# Patient Record
Sex: MALE | Race: WHITE
[De-identification: names, ages, dates, MRNs, and addresses within clinical notes are randomized per-mention and may not be internally consistent; named-entity substitution may affect disease eponyms.]

---

## 2018-04-18 ENCOUNTER — HOSPITAL ENCOUNTER (EMERGENCY)
Dept: HOSPITAL 61 - ER | Age: 83
Discharge: HOME | End: 2018-04-18
Payer: MEDICARE

## 2018-04-18 DIAGNOSIS — I71.4: ICD-10-CM

## 2018-04-18 DIAGNOSIS — B34.9: Primary | ICD-10-CM

## 2018-04-18 LAB
AGAP ISTAT: 16 MMOL/L (ref 6–14)
BUN ISTAT: 29 MG/DL (ref 8–26)
CHLORIDE SERPL-SCNC: 102 MMOL/L (ref 98–110)
CREATININE ISTAT: 1.5 MG/DL (ref 0.5–1.4)
GLUCOSE BLD-MCNC: 138 MG/DL (ref 70–99)
HEMATOCRIT ISTAT: 37 % (ref 37–52)
HEMOGLOBIN ISTAT: 12.6 G/DL (ref 14–18)
ION CA ISTAT: 1.19 MMOL/L (ref 1.13–1.32)
POTASSIUM ISTAT: 3.9 MMOL/L (ref 3.5–5)
SODIUM SERPL-SCNC: 142 MMOL/L (ref 135–145)
TOT CO2 ISTAT: 29 MMOL/L (ref 23–32)
TROPONIN BY ISTAT: 0 NG/ML (ref ?–0.08)

## 2018-04-18 PROCEDURE — 84484 ASSAY OF TROPONIN QUANT: CPT

## 2018-04-18 PROCEDURE — 80047 BASIC METABLC PNL IONIZED CA: CPT

## 2018-04-18 PROCEDURE — 85018 HEMOGLOBIN: CPT

## 2018-04-18 PROCEDURE — 85014 HEMATOCRIT: CPT

## 2018-04-18 PROCEDURE — 36415 COLL VENOUS BLD VENIPUNCTURE: CPT

## 2018-04-18 PROCEDURE — 96361 HYDRATE IV INFUSION ADD-ON: CPT

## 2018-04-18 PROCEDURE — 96374 THER/PROPH/DIAG INJ IV PUSH: CPT

## 2018-04-18 PROCEDURE — 93005 ELECTROCARDIOGRAM TRACING: CPT

## 2018-04-18 PROCEDURE — 99285 EMERGENCY DEPT VISIT HI MDM: CPT

## 2018-04-18 RX ADMIN — BACITRACIN 1 MLS/HR: 5000 INJECTION, POWDER, FOR SOLUTION INTRAMUSCULAR at 02:41

## 2018-04-18 RX ADMIN — ONDANSETRON 1 MG: 2 INJECTION INTRAMUSCULAR; INTRAVENOUS at 02:41

## 2018-05-04 LAB
CREAT SERPL-MCNC: 1.6 MG/DL (ref 0.7–1.3)
GFR SERPLBLD BASED ON 1.73 SQ M-ARVRAT: 41.7 ML/MIN

## 2018-05-07 ENCOUNTER — HOSPITAL ENCOUNTER (OUTPATIENT)
Dept: HOSPITAL 61 - CT | Age: 83
Discharge: HOME | End: 2018-05-07
Attending: SPECIALIST
Payer: MEDICARE

## 2018-05-07 DIAGNOSIS — I10: ICD-10-CM

## 2018-05-07 DIAGNOSIS — N28.1: ICD-10-CM

## 2018-05-07 DIAGNOSIS — I71.4: Primary | ICD-10-CM

## 2018-05-07 DIAGNOSIS — K57.30: ICD-10-CM

## 2018-05-07 DIAGNOSIS — E11.9: ICD-10-CM

## 2018-05-07 DIAGNOSIS — Z87.891: ICD-10-CM

## 2018-05-07 PROCEDURE — 74174 CTA ABD&PLVS W/CONTRAST: CPT

## 2018-05-07 PROCEDURE — 36415 COLL VENOUS BLD VENIPUNCTURE: CPT

## 2018-05-07 PROCEDURE — 82565 ASSAY OF CREATININE: CPT

## 2018-05-07 RX ADMIN — IOHEXOL 1 ML: 300 INJECTION, SOLUTION INTRAVENOUS at 10:36

## 2018-06-11 ENCOUNTER — HOSPITAL ENCOUNTER (INPATIENT)
Dept: HOSPITAL 61 - 4 NORTH | Age: 83
LOS: 11 days | Discharge: HOME HEALTH | DRG: 853 | End: 2018-06-22
Attending: INTERNAL MEDICINE | Admitting: INTERNAL MEDICINE
Payer: MEDICARE

## 2018-06-11 DIAGNOSIS — J44.1: ICD-10-CM

## 2018-06-11 DIAGNOSIS — N40.0: ICD-10-CM

## 2018-06-11 DIAGNOSIS — E86.0: ICD-10-CM

## 2018-06-11 DIAGNOSIS — K63.1: ICD-10-CM

## 2018-06-11 DIAGNOSIS — E11.22: ICD-10-CM

## 2018-06-11 DIAGNOSIS — Z87.11: ICD-10-CM

## 2018-06-11 DIAGNOSIS — K56.7: ICD-10-CM

## 2018-06-11 DIAGNOSIS — K66.8: ICD-10-CM

## 2018-06-11 DIAGNOSIS — J98.11: ICD-10-CM

## 2018-06-11 DIAGNOSIS — I12.9: ICD-10-CM

## 2018-06-11 DIAGNOSIS — N17.0: ICD-10-CM

## 2018-06-11 DIAGNOSIS — K26.1: ICD-10-CM

## 2018-06-11 DIAGNOSIS — N28.1: ICD-10-CM

## 2018-06-11 DIAGNOSIS — K26.5: ICD-10-CM

## 2018-06-11 DIAGNOSIS — I71.4: ICD-10-CM

## 2018-06-11 DIAGNOSIS — Z82.49: ICD-10-CM

## 2018-06-11 DIAGNOSIS — N18.3: ICD-10-CM

## 2018-06-11 DIAGNOSIS — A41.9: Primary | ICD-10-CM

## 2018-06-11 DIAGNOSIS — J96.00: ICD-10-CM

## 2018-06-11 DIAGNOSIS — Z87.891: ICD-10-CM

## 2018-06-11 PROCEDURE — 83690 ASSAY OF LIPASE: CPT

## 2018-06-11 PROCEDURE — 96361 HYDRATE IV INFUSION ADD-ON: CPT

## 2018-06-11 PROCEDURE — 97166 OT EVAL MOD COMPLEX 45 MIN: CPT

## 2018-06-11 PROCEDURE — 97530 THERAPEUTIC ACTIVITIES: CPT

## 2018-06-11 PROCEDURE — 97116 GAIT TRAINING THERAPY: CPT

## 2018-06-11 PROCEDURE — 82962 GLUCOSE BLOOD TEST: CPT

## 2018-06-11 PROCEDURE — 80053 COMPREHEN METABOLIC PANEL: CPT

## 2018-06-11 PROCEDURE — 80076 HEPATIC FUNCTION PANEL: CPT

## 2018-06-11 PROCEDURE — 94640 AIRWAY INHALATION TREATMENT: CPT

## 2018-06-11 PROCEDURE — 97162 PT EVAL MOD COMPLEX 30 MIN: CPT

## 2018-06-11 PROCEDURE — 85610 PROTHROMBIN TIME: CPT

## 2018-06-11 PROCEDURE — 97535 SELF CARE MNGMENT TRAINING: CPT

## 2018-06-11 PROCEDURE — 86850 RBC ANTIBODY SCREEN: CPT

## 2018-06-11 PROCEDURE — 83605 ASSAY OF LACTIC ACID: CPT

## 2018-06-11 PROCEDURE — 80048 BASIC METABOLIC PNL TOTAL CA: CPT

## 2018-06-11 PROCEDURE — 93005 ELECTROCARDIOGRAM TRACING: CPT

## 2018-06-11 PROCEDURE — 96375 TX/PRO/DX INJ NEW DRUG ADDON: CPT

## 2018-06-11 PROCEDURE — 86900 BLOOD TYPING SEROLOGIC ABO: CPT

## 2018-06-11 PROCEDURE — 71275 CT ANGIOGRAPHY CHEST: CPT

## 2018-06-11 PROCEDURE — 85025 COMPLETE CBC W/AUTO DIFF WBC: CPT

## 2018-06-11 PROCEDURE — 86901 BLOOD TYPING SEROLOGIC RH(D): CPT

## 2018-06-11 PROCEDURE — 36415 COLL VENOUS BLD VENIPUNCTURE: CPT

## 2018-06-11 PROCEDURE — 94760 N-INVAS EAR/PLS OXIMETRY 1: CPT

## 2018-06-11 PROCEDURE — 71045 X-RAY EXAM CHEST 1 VIEW: CPT

## 2018-06-11 PROCEDURE — 96365 THER/PROPH/DIAG IV INF INIT: CPT

## 2018-06-11 PROCEDURE — 84484 ASSAY OF TROPONIN QUANT: CPT

## 2018-06-11 PROCEDURE — 87040 BLOOD CULTURE FOR BACTERIA: CPT

## 2018-06-11 PROCEDURE — 96376 TX/PRO/DX INJ SAME DRUG ADON: CPT

## 2018-06-11 PROCEDURE — 85007 BL SMEAR W/DIFF WBC COUNT: CPT

## 2018-06-11 PROCEDURE — 99285 EMERGENCY DEPT VISIT HI MDM: CPT

## 2018-06-11 PROCEDURE — 74174 CTA ABD&PLVS W/CONTRAST: CPT

## 2018-06-11 PROCEDURE — 85730 THROMBOPLASTIN TIME PARTIAL: CPT

## 2018-06-12 LAB
ADD MAN DIFF?: NO
ALBUMIN SERPL-MCNC: 3.9 G/DL (ref 3.4–5)
ALP SERPL-CCNC: 73 U/L (ref 46–116)
ALT (SGPT): 19 U/L (ref 16–63)
ANION GAP SERPL CALC-SCNC: 10 MMOL/L (ref 6–14)
AST SERPL-CCNC: 13 U/L (ref 15–37)
BASO #: 0.1 X10^3/UL (ref 0–0.2)
BASO %: 1 % (ref 0–3)
BILIRUB DIRECT SERPL-MCNC: 0.1 MG/DL (ref 0–0.2)
BLOOD UREA NITROGEN: 36 MG/DL (ref 8–26)
CALCIUM: 10.3 MG/DL (ref 8.5–10.1)
CHLORIDE: 103 MMOL/L (ref 98–107)
CO2 SERPL-SCNC: 28 MMOL/L (ref 21–32)
CREAT SERPL-MCNC: 1.5 MG/DL (ref 0.7–1.3)
EOS #: 0.3 X10^3/UL (ref 0–0.7)
EOS %: 4 % (ref 0–3)
GFR SERPLBLD BASED ON 1.73 SQ M-ARVRAT: 44.9 ML/MIN
GLUCOSE SERPL-MCNC: 113 MG/DL (ref 70–99)
HCG SERPL-ACNC: 7.9 X10^3/UL (ref 4–11)
HEMATOCRIT: 34.9 % (ref 39–53)
HEMOGLOBIN: 11.9 G/DL (ref 13–17.5)
INR: 1.2 (ref 0.8–1.1)
LIPASE: 177 U/L (ref 73–393)
LYMPH #: 2 X10^3/UL (ref 1–4.8)
LYMPH %: 26 % (ref 24–48)
MEAN CORPUSCULAR HEMOGLOBIN: 32 PG (ref 25–35)
MEAN CORPUSCULAR HGB CONC: 34 G/DL (ref 31–37)
MEAN CORPUSCULAR VOLUME: 93 FL (ref 79–100)
MONO #: 0.6 X10^3/UL (ref 0–1.1)
MONO %: 7 % (ref 0–9)
NEUT #: 4.9 X10^3UL (ref 1.8–7.7)
NEUT %: 62 % (ref 31–73)
PARTIAL THROMBOPLASTIN TIME: 30 SEC (ref 24–38)
PLATELET COUNT: 209 X10^3/UL (ref 140–400)
POC GLUCOSE: 128 MG/DL (ref 70–99)
POC GLUCOSE: 128 MG/DL (ref 70–99)
POC GLUCOSE: 143 MG/DL (ref 70–99)
POTASSIUM SERPL-SCNC: 4.4 MMOL/L (ref 3.5–5.1)
PROTHROMBIN TIME PATIENT: 14.4 SEC (ref 11.7–14)
RED BLOOD COUNT: 3.74 X10^6/UL (ref 4.3–5.7)
RED CELL DISTRIBUTION WIDTH: 12.7 % (ref 11.5–14.5)
SODIUM: 141 MMOL/L (ref 136–145)
TOTAL BILIRUBIN: 0.4 MG/DL (ref 0.2–1)
TOTAL PROTEIN: 6.9 G/DL (ref 6.4–8.2)
TROPONINI: < 0.017 NG/ML (ref 0–0.06)

## 2018-06-12 PROCEDURE — 0DU947Z SUPPLEMENT DUODENUM WITH AUTOLOGOUS TISSUE SUBSTITUTE, PERCUTANEOUS ENDOSCOPIC APPROACH: ICD-10-PCS

## 2018-06-12 RX ADMIN — FENTANYL CITRATE 1 MCG: 50 INJECTION INTRAMUSCULAR; INTRAVENOUS at 00:36

## 2018-06-12 RX ADMIN — PANTOPRAZOLE SODIUM 1 MLS/HR: 40 INJECTION, POWDER, FOR SOLUTION INTRAVENOUS at 09:43

## 2018-06-12 RX ADMIN — FENTANYL CITRATE 1 MCG: 50 INJECTION INTRAMUSCULAR; INTRAVENOUS at 01:38

## 2018-06-12 RX ADMIN — FENTANYL CITRATE 1 MCG: 50 INJECTION INTRAMUSCULAR; INTRAVENOUS at 06:55

## 2018-06-12 RX ADMIN — PANTOPRAZOLE SODIUM 1 MG: 40 INJECTION, POWDER, FOR SOLUTION INTRAVENOUS at 17:04

## 2018-06-12 RX ADMIN — PIPERACILLIN SODIUM AND TAZOBACTAM SODIUM 1 MLS/HR: 3; .375 INJECTION, POWDER, LYOPHILIZED, FOR SOLUTION INTRAVENOUS at 09:10

## 2018-06-12 RX ADMIN — SODIUM CHLORIDE 1 MLS/HR: 450 INJECTION, SOLUTION INTRAVENOUS at 17:03

## 2018-06-12 RX ADMIN — ONDANSETRON 1 MG: 2 INJECTION INTRAMUSCULAR; INTRAVENOUS at 02:44

## 2018-06-12 RX ADMIN — FENTANYL CITRATE 1 MCG: 50 INJECTION INTRAMUSCULAR; INTRAVENOUS at 06:33

## 2018-06-12 RX ADMIN — PIPERACILLIN SODIUM AND TAZOBACTAM SODIUM 1 MLS/HR: 3; .375 INJECTION, POWDER, LYOPHILIZED, FOR SOLUTION INTRAVENOUS at 23:54

## 2018-06-12 RX ADMIN — ENOXAPARIN SODIUM 1 MG: 40 INJECTION SUBCUTANEOUS at 17:05

## 2018-06-12 RX ADMIN — PIPERACILLIN SODIUM AND TAZOBACTAM SODIUM 1 MLS/HR: 3; .375 INJECTION, POWDER, LYOPHILIZED, FOR SOLUTION INTRAVENOUS at 17:05

## 2018-06-12 RX ADMIN — BACITRACIN 1 MLS/HR: 5000 INJECTION, POWDER, FOR SOLUTION INTRAMUSCULAR at 00:35

## 2018-06-12 RX ADMIN — PANTOPRAZOLE SODIUM 1 MG: 40 INJECTION, POWDER, FOR SOLUTION INTRAVENOUS at 09:10

## 2018-06-12 RX ADMIN — PIPERACILLIN SODIUM AND TAZOBACTAM SODIUM 1 MLS/HR: 3; .375 INJECTION, POWDER, LYOPHILIZED, FOR SOLUTION INTRAVENOUS at 03:30

## 2018-06-12 RX ADMIN — SODIUM CHLORIDE 1 MLS/HR: 450 INJECTION, SOLUTION INTRAVENOUS at 09:44

## 2018-06-12 RX ADMIN — SODIUM CHLORIDE, SODIUM LACTATE, POTASSIUM CHLORIDE, AND CALCIUM CHLORIDE 1 MLS/HR: .6; .31; .03; .02 INJECTION, SOLUTION INTRAVENOUS at 03:33

## 2018-06-12 RX ADMIN — FENTANYL CITRATE 1 MCG: 50 INJECTION INTRAMUSCULAR; INTRAVENOUS at 02:45

## 2018-06-12 RX ADMIN — SODIUM CHLORIDE, SODIUM LACTATE, POTASSIUM CHLORIDE, AND CALCIUM CHLORIDE 1 MLS/HR: .6; .31; .03; .02 INJECTION, SOLUTION INTRAVENOUS at 09:10

## 2018-06-12 RX ADMIN — Medication 1 CAP: at 21:00

## 2018-06-12 RX ADMIN — ONDANSETRON 1 MG: 2 INJECTION INTRAMUSCULAR; INTRAVENOUS at 00:35

## 2018-06-12 RX ADMIN — IOHEXOL 1 ML: 300 INJECTION, SOLUTION INTRAVENOUS at 09:49

## 2018-06-12 RX ADMIN — PIPERACILLIN SODIUM AND TAZOBACTAM SODIUM 1 MLS/HR: 3; .375 INJECTION, POWDER, LYOPHILIZED, FOR SOLUTION INTRAVENOUS at 11:53

## 2018-06-12 RX ADMIN — MORPHINE SULFATE 1 MG: 4 INJECTION, SOLUTION INTRAMUSCULAR; INTRAVENOUS at 03:34

## 2018-06-13 LAB
% BANDS: 16 % (ref 0–9)
% BASOS: 1 % (ref 0–3)
% LYMPHS: 3 % (ref 24–48)
% METAS: 3 % (ref 0–0)
% MONOS: 4 % (ref 0–10)
% SEGS: 73 % (ref 35–66)
ADD MAN DIFF?: YES
ANION GAP SERPL CALC-SCNC: 7 MMOL/L (ref 6–14)
BASO #: 0 X10^3/UL (ref 0–0.2)
BASO %: 0 % (ref 0–3)
BLOOD UREA NITROGEN: 31 MG/DL (ref 8–26)
CALCIUM: 8.7 MG/DL (ref 8.5–10.1)
CHLORIDE: 103 MMOL/L (ref 98–107)
CO2 SERPL-SCNC: 27 MMOL/L (ref 21–32)
CREAT SERPL-MCNC: 1.7 MG/DL (ref 0.7–1.3)
DOHLE BODIES: PRESENT
EOS #: 0 X10^3/UL (ref 0–0.7)
EOS %: 0 % (ref 0–3)
GFR SERPLBLD BASED ON 1.73 SQ M-ARVRAT: 38.9 ML/MIN
GLUCOSE SERPL-MCNC: 116 MG/DL (ref 70–99)
HCG SERPL-ACNC: 12.2 X10^3/UL (ref 4–11)
HEMATOCRIT: 31.5 % (ref 39–53)
HEMOGLOBIN: 10.6 G/DL (ref 13–17.5)
LACTIC ACID: 1.5 MMOL/L (ref 0.4–2)
LYMPH #: 0.6 X10^3/UL (ref 1–4.8)
LYMPH %: 5 % (ref 24–48)
MEAN CORPUSCULAR HEMOGLOBIN: 32 PG (ref 25–35)
MEAN CORPUSCULAR HGB CONC: 34 G/DL (ref 31–37)
MEAN CORPUSCULAR VOLUME: 94 FL (ref 79–100)
MONO #: 1 X10^3/UL (ref 0–1.1)
MONO %: 8 % (ref 0–9)
NEUT #: 10.6 X10^3UL (ref 1.8–7.7)
NEUT %: 87 % (ref 31–73)
PLATELET COUNT: 175 X10^3/UL (ref 140–400)
PLT ESTIMATE: ADEQUATE
POC GLUCOSE: 100 MG/DL (ref 70–99)
POC GLUCOSE: 119 MG/DL (ref 70–99)
POC GLUCOSE: 99 MG/DL (ref 70–99)
POTASSIUM SERPL-SCNC: 4.6 MMOL/L (ref 3.5–5.1)
RED BLOOD COUNT: 3.36 X10^6/UL (ref 4.3–5.7)
RED CELL DISTRIBUTION WIDTH: 13.1 % (ref 11.5–14.5)
SODIUM: 137 MMOL/L (ref 136–145)
TOXIC VACUOLATION: SLIGHT

## 2018-06-13 RX ADMIN — VANCOMYCIN HYDROCHLORIDE 1 MLS/HR: 1 INJECTION, POWDER, FOR SOLUTION INTRAVENOUS at 16:19

## 2018-06-13 RX ADMIN — ALBUTEROL SULFATE 1 MG: 108 AEROSOL, METERED RESPIRATORY (INHALATION) at 17:31

## 2018-06-13 RX ADMIN — PIPERACILLIN SODIUM AND TAZOBACTAM SODIUM 1 MLS/HR: 3; .375 INJECTION, POWDER, LYOPHILIZED, FOR SOLUTION INTRAVENOUS at 13:00

## 2018-06-13 RX ADMIN — BACITRACIN 1 MLS/HR: 5000 INJECTION, POWDER, FOR SOLUTION INTRAMUSCULAR at 22:00

## 2018-06-13 RX ADMIN — GLYCERIN, ISOLEUCINE, LEUCINE, LYSINE, METHIONINE, PHENYLALANINE, THREONINE, TRYPTOPHAN, VALINE, ALANINE, GLYCINE, ARGININE, HISTIDINE, PROLINE, SERINE, CYSTEINE, SODIUM ACETATE, MAGNESIUM ACETATE, CALCIUM ACETATE, SODIUM CHLORIDE, POTASSIUM CHLORIDE, PHOSPHORIC ACID, AND POTASSIUM METABISULFITE 1 MLS/HR
3; .21; .27; .22; .16; .17; .12; .046; .2; .21; .42; .29; .085; .34; .18; .014; .2; .054; .026; .12; .15; .041 INJECTION INTRAVENOUS at 16:18

## 2018-06-13 RX ADMIN — BACITRACIN 1 MLS/HR: 5000 INJECTION, POWDER, FOR SOLUTION INTRAMUSCULAR at 16:48

## 2018-06-13 RX ADMIN — ENOXAPARIN SODIUM 1 MG: 40 INJECTION SUBCUTANEOUS at 16:30

## 2018-06-13 RX ADMIN — PANTOPRAZOLE SODIUM 1 MG: 40 INJECTION, POWDER, FOR SOLUTION INTRAVENOUS at 10:52

## 2018-06-13 RX ADMIN — VANCOMYCIN HYDROCHLORIDE 1 EACH: 1 INJECTION, POWDER, LYOPHILIZED, FOR SOLUTION INTRAVENOUS at 18:44

## 2018-06-13 RX ADMIN — PIPERACILLIN SODIUM AND TAZOBACTAM SODIUM 1 MLS/HR: 3; .375 INJECTION, POWDER, LYOPHILIZED, FOR SOLUTION INTRAVENOUS at 18:15

## 2018-06-13 RX ADMIN — SODIUM CHLORIDE 1 MLS/HR: 450 INJECTION, SOLUTION INTRAVENOUS at 05:59

## 2018-06-13 RX ADMIN — Medication 1 CAP: at 21:00

## 2018-06-13 RX ADMIN — PIPERACILLIN SODIUM AND TAZOBACTAM SODIUM 1 MLS/HR: 3; .375 INJECTION, POWDER, LYOPHILIZED, FOR SOLUTION INTRAVENOUS at 06:00

## 2018-06-13 RX ADMIN — Medication 1 CAP: at 09:00

## 2018-06-13 RX ADMIN — ALBUTEROL SULFATE 1 MG: 108 AEROSOL, METERED RESPIRATORY (INHALATION) at 22:17

## 2018-06-14 LAB
ADD MAN DIFF?: NO
ANION GAP SERPL CALC-SCNC: 9 MMOL/L (ref 6–14)
BASO #: 0 X10^3/UL (ref 0–0.2)
BASO %: 0 % (ref 0–3)
BLOOD UREA NITROGEN: 27 MG/DL (ref 8–26)
CALCIUM: 8.2 MG/DL (ref 8.5–10.1)
CHLORIDE: 99 MMOL/L (ref 98–107)
CO2 SERPL-SCNC: 23 MMOL/L (ref 21–32)
CREAT SERPL-MCNC: 1.4 MG/DL (ref 0.7–1.3)
EOS #: 0 X10^3/UL (ref 0–0.7)
EOS %: 0 % (ref 0–3)
GFR SERPLBLD BASED ON 1.73 SQ M-ARVRAT: 48.6 ML/MIN
GLUCOSE SERPL-MCNC: 164 MG/DL (ref 70–99)
HCG SERPL-ACNC: 13.5 X10^3/UL (ref 4–11)
HEMATOCRIT: 30.7 % (ref 39–53)
HEMOGLOBIN: 10.5 G/DL (ref 13–17.5)
LACTIC ACID: 1 MMOL/L (ref 0.4–2)
LACTIC ACID: 1.4 MMOL/L (ref 0.4–2)
LACTIC ACID: 2.4 MMOL/L (ref 0.4–2)
LYMPH #: 0.5 X10^3/UL (ref 1–4.8)
LYMPH %: 4 % (ref 24–48)
MEAN CORPUSCULAR HEMOGLOBIN: 32 PG (ref 25–35)
MEAN CORPUSCULAR HGB CONC: 34 G/DL (ref 31–37)
MEAN CORPUSCULAR VOLUME: 93 FL (ref 79–100)
MONO #: 0.6 X10^3/UL (ref 0–1.1)
MONO %: 5 % (ref 0–9)
NEUT #: 12.3 X10^3UL (ref 1.8–7.7)
NEUT %: 92 % (ref 31–73)
PLATELET COUNT: 191 X10^3/UL (ref 140–400)
POC GLUCOSE: 135 MG/DL (ref 70–99)
POC GLUCOSE: 158 MG/DL (ref 70–99)
POC GLUCOSE: 163 MG/DL (ref 70–99)
POC GLUCOSE: 173 MG/DL (ref 70–99)
POTASSIUM SERPL-SCNC: 3.9 MMOL/L (ref 3.5–5.1)
RED BLOOD COUNT: 3.3 X10^6/UL (ref 4.3–5.7)
RED CELL DISTRIBUTION WIDTH: 12.6 % (ref 11.5–14.5)
SODIUM: 131 MMOL/L (ref 136–145)

## 2018-06-14 RX ADMIN — ALBUTEROL SULFATE 1 MG: 108 AEROSOL, METERED RESPIRATORY (INHALATION) at 09:47

## 2018-06-14 RX ADMIN — PIPERACILLIN SODIUM AND TAZOBACTAM SODIUM 1 MLS/HR: 3; .375 INJECTION, POWDER, LYOPHILIZED, FOR SOLUTION INTRAVENOUS at 12:36

## 2018-06-14 RX ADMIN — TAMSULOSIN HYDROCHLORIDE 1 MG: 0.4 CAPSULE ORAL at 09:35

## 2018-06-14 RX ADMIN — GLYCERIN, ISOLEUCINE, LEUCINE, LYSINE, METHIONINE, PHENYLALANINE, THREONINE, TRYPTOPHAN, VALINE, ALANINE, GLYCINE, ARGININE, HISTIDINE, PROLINE, SERINE, CYSTEINE, SODIUM ACETATE, MAGNESIUM ACETATE, CALCIUM ACETATE, SODIUM CHLORIDE, POTASSIUM CHLORIDE, PHOSPHORIC ACID, AND POTASSIUM METABISULFITE 1 MLS/HR
3; .21; .27; .22; .16; .17; .12; .046; .2; .21; .42; .29; .085; .34; .18; .014; .2; .054; .026; .12; .15; .041 INJECTION INTRAVENOUS at 15:15

## 2018-06-14 RX ADMIN — PIPERACILLIN SODIUM AND TAZOBACTAM SODIUM 1 MLS/HR: 3; .375 INJECTION, POWDER, LYOPHILIZED, FOR SOLUTION INTRAVENOUS at 18:00

## 2018-06-14 RX ADMIN — GLYCERIN, ISOLEUCINE, LEUCINE, LYSINE, METHIONINE, PHENYLALANINE, THREONINE, TRYPTOPHAN, VALINE, ALANINE, GLYCINE, ARGININE, HISTIDINE, PROLINE, SERINE, CYSTEINE, SODIUM ACETATE, MAGNESIUM ACETATE, CALCIUM ACETATE, SODIUM CHLORIDE, POTASSIUM CHLORIDE, PHOSPHORIC ACID, AND POTASSIUM METABISULFITE 1 MLS/HR
3; .21; .27; .22; .16; .17; .12; .046; .2; .21; .42; .29; .085; .34; .18; .014; .2; .054; .026; .12; .15; .041 INJECTION INTRAVENOUS at 06:00

## 2018-06-14 RX ADMIN — ALBUTEROL SULFATE 1 MG: 108 AEROSOL, METERED RESPIRATORY (INHALATION) at 02:40

## 2018-06-14 RX ADMIN — PIPERACILLIN SODIUM AND TAZOBACTAM SODIUM 1 MLS/HR: 3; .375 INJECTION, POWDER, LYOPHILIZED, FOR SOLUTION INTRAVENOUS at 06:05

## 2018-06-14 RX ADMIN — BACITRACIN 1 MLS/HR: 5000 INJECTION, POWDER, FOR SOLUTION INTRAMUSCULAR at 13:27

## 2018-06-14 RX ADMIN — Medication 1 CAP: at 21:00

## 2018-06-14 RX ADMIN — ENOXAPARIN SODIUM 1 MG: 40 INJECTION SUBCUTANEOUS at 16:21

## 2018-06-14 RX ADMIN — FLUCONAZOLE IN SODIUM CHLORIDE 1 MLS/HR: 2 INJECTION, SOLUTION INTRAVENOUS at 13:55

## 2018-06-14 RX ADMIN — BACITRACIN 1 MLS/HR: 5000 INJECTION, POWDER, FOR SOLUTION INTRAMUSCULAR at 04:00

## 2018-06-14 RX ADMIN — ACETAMINOPHEN 1 MG: 325 SUPPOSITORY RECTAL at 14:06

## 2018-06-14 RX ADMIN — PIPERACILLIN SODIUM AND TAZOBACTAM SODIUM 1 MLS/HR: 3; .375 INJECTION, POWDER, LYOPHILIZED, FOR SOLUTION INTRAVENOUS at 00:25

## 2018-06-14 RX ADMIN — PANTOPRAZOLE SODIUM 1 MG: 40 INJECTION, POWDER, FOR SOLUTION INTRAVENOUS at 08:27

## 2018-06-14 RX ADMIN — Medication 1 CAP: at 08:18

## 2018-06-15 LAB
ADD MAN DIFF?: NO
ALBUMIN SERPL-MCNC: 2.1 G/DL (ref 3.4–5)
ALBUMIN/GLOB SERPL: 0.5 {RATIO} (ref 1–1.7)
ALP SERPL-CCNC: 57 U/L (ref 46–116)
ALT (SGPT): 10 U/L (ref 16–63)
ANION GAP SERPL CALC-SCNC: 9 MMOL/L (ref 6–14)
AST SERPL-CCNC: 11 U/L (ref 15–37)
BASO #: 0 X10^3/UL (ref 0–0.2)
BASO %: 0 % (ref 0–3)
BLOOD UREA NITROGEN: 22 MG/DL (ref 8–26)
BUN/CREAT SERPL: 20 (ref 6–20)
CALCIUM: 8.4 MG/DL (ref 8.5–10.1)
CHLORIDE: 100 MMOL/L (ref 98–107)
CO2 SERPL-SCNC: 23 MMOL/L (ref 21–32)
CREAT SERPL-MCNC: 1.1 MG/DL (ref 0.7–1.3)
EOS #: 0.1 X10^3/UL (ref 0–0.7)
EOS %: 0 % (ref 0–3)
GFR SERPLBLD BASED ON 1.73 SQ M-ARVRAT: 64.2 ML/MIN
GLOBULIN SER-MCNC: 3.9 G/DL (ref 2.2–3.8)
GLUCOSE SERPL-MCNC: 146 MG/DL (ref 70–99)
HCG SERPL-ACNC: 14.5 X10^3/UL (ref 4–11)
HEMATOCRIT: 31.2 % (ref 39–53)
HEMOGLOBIN: 10.6 G/DL (ref 13–17.5)
LACTIC ACID: 1.4 MMOL/L (ref 0.4–2)
LYMPH #: 0.4 X10^3/UL (ref 1–4.8)
LYMPH %: 3 % (ref 24–48)
MEAN CORPUSCULAR HEMOGLOBIN: 31 PG (ref 25–35)
MEAN CORPUSCULAR HGB CONC: 34 G/DL (ref 31–37)
MEAN CORPUSCULAR VOLUME: 92 FL (ref 79–100)
MONO #: 0.9 X10^3/UL (ref 0–1.1)
MONO %: 6 % (ref 0–9)
NEUT #: 13.1 X10^3UL (ref 1.8–7.7)
NEUT %: 91 % (ref 31–73)
PLATELET COUNT: 207 X10^3/UL (ref 140–400)
POC GLUCOSE: 118 MG/DL (ref 70–99)
POC GLUCOSE: 127 MG/DL (ref 70–99)
POC GLUCOSE: 136 MG/DL (ref 70–99)
POC GLUCOSE: 138 MG/DL (ref 70–99)
POTASSIUM SERPL-SCNC: 3.6 MMOL/L (ref 3.5–5.1)
RED BLOOD COUNT: 3.38 X10^6/UL (ref 4.3–5.7)
RED CELL DISTRIBUTION WIDTH: 12.8 % (ref 11.5–14.5)
SODIUM: 132 MMOL/L (ref 136–145)
TOTAL BILIRUBIN: 0.4 MG/DL (ref 0.2–1)
TOTAL PROTEIN: 6 G/DL (ref 6.4–8.2)

## 2018-06-15 RX ADMIN — ENOXAPARIN SODIUM 1 MG: 40 INJECTION SUBCUTANEOUS at 16:00

## 2018-06-15 RX ADMIN — Medication 1 CAP: at 07:59

## 2018-06-15 RX ADMIN — GLYCERIN, ISOLEUCINE, LEUCINE, LYSINE, METHIONINE, PHENYLALANINE, THREONINE, TRYPTOPHAN, VALINE, ALANINE, GLYCINE, ARGININE, HISTIDINE, PROLINE, SERINE, CYSTEINE, SODIUM ACETATE, MAGNESIUM ACETATE, CALCIUM ACETATE, SODIUM CHLORIDE, POTASSIUM CHLORIDE, PHOSPHORIC ACID, AND POTASSIUM METABISULFITE 1 MLS/HR
3; .21; .27; .22; .16; .17; .12; .046; .2; .21; .42; .29; .085; .34; .18; .014; .2; .054; .026; .12; .15; .041 INJECTION INTRAVENOUS at 04:11

## 2018-06-15 RX ADMIN — PIPERACILLIN SODIUM AND TAZOBACTAM SODIUM 1 MLS/HR: 3; .375 INJECTION, POWDER, LYOPHILIZED, FOR SOLUTION INTRAVENOUS at 06:05

## 2018-06-15 RX ADMIN — PIPERACILLIN SODIUM AND TAZOBACTAM SODIUM 1 MLS/HR: 3; .375 INJECTION, POWDER, LYOPHILIZED, FOR SOLUTION INTRAVENOUS at 18:06

## 2018-06-15 RX ADMIN — PIPERACILLIN SODIUM AND TAZOBACTAM SODIUM 1 MLS/HR: 3; .375 INJECTION, POWDER, LYOPHILIZED, FOR SOLUTION INTRAVENOUS at 00:16

## 2018-06-15 RX ADMIN — FLUCONAZOLE IN SODIUM CHLORIDE 1 MLS/HR: 2 INJECTION, SOLUTION INTRAVENOUS at 11:48

## 2018-06-15 RX ADMIN — PANTOPRAZOLE SODIUM 1 MG: 40 INJECTION, POWDER, FOR SOLUTION INTRAVENOUS at 08:16

## 2018-06-15 RX ADMIN — PIPERACILLIN SODIUM AND TAZOBACTAM SODIUM 1 MLS/HR: 3; .375 INJECTION, POWDER, LYOPHILIZED, FOR SOLUTION INTRAVENOUS at 23:42

## 2018-06-15 RX ADMIN — Medication 1 CAP: at 20:59

## 2018-06-15 RX ADMIN — GLYCERIN, ISOLEUCINE, LEUCINE, LYSINE, METHIONINE, PHENYLALANINE, THREONINE, TRYPTOPHAN, VALINE, ALANINE, GLYCINE, ARGININE, HISTIDINE, PROLINE, SERINE, CYSTEINE, SODIUM ACETATE, MAGNESIUM ACETATE, CALCIUM ACETATE, SODIUM CHLORIDE, POTASSIUM CHLORIDE, PHOSPHORIC ACID, AND POTASSIUM METABISULFITE 1 MLS/HR
3; .21; .27; .22; .16; .17; .12; .046; .2; .21; .42; .29; .085; .34; .18; .014; .2; .054; .026; .12; .15; .041 INJECTION INTRAVENOUS at 16:08

## 2018-06-15 RX ADMIN — TAMSULOSIN HYDROCHLORIDE 1 MG: 0.4 CAPSULE ORAL at 08:17

## 2018-06-15 RX ADMIN — PIPERACILLIN SODIUM AND TAZOBACTAM SODIUM 1 MLS/HR: 3; .375 INJECTION, POWDER, LYOPHILIZED, FOR SOLUTION INTRAVENOUS at 11:51

## 2018-06-16 LAB
ADD MAN DIFF?: NO
ALBUMIN SERPL-MCNC: 2 G/DL (ref 3.4–5)
ALBUMIN/GLOB SERPL: 0.5 {RATIO} (ref 1–1.7)
ALP SERPL-CCNC: 53 U/L (ref 46–116)
ALT (SGPT): 14 U/L (ref 16–63)
ANION GAP SERPL CALC-SCNC: 7 MMOL/L (ref 6–14)
AST SERPL-CCNC: 12 U/L (ref 15–37)
BASO #: 0 X10^3/UL (ref 0–0.2)
BASO %: 0 % (ref 0–3)
BLOOD UREA NITROGEN: 22 MG/DL (ref 8–26)
BUN/CREAT SERPL: 22 (ref 6–20)
CALCIUM: 8.4 MG/DL (ref 8.5–10.1)
CHLORIDE: 99 MMOL/L (ref 98–107)
CO2 SERPL-SCNC: 24 MMOL/L (ref 21–32)
CREAT SERPL-MCNC: 1 MG/DL (ref 0.7–1.3)
EOS #: 0.3 X10^3/UL (ref 0–0.7)
EOS %: 2 % (ref 0–3)
GFR SERPLBLD BASED ON 1.73 SQ M-ARVRAT: 71.7 ML/MIN
GLOBULIN SER-MCNC: 3.8 G/DL (ref 2.2–3.8)
GLUCOSE SERPL-MCNC: 130 MG/DL (ref 70–99)
HCG SERPL-ACNC: 12.6 X10^3/UL (ref 4–11)
HEMATOCRIT: 28.2 % (ref 39–53)
HEMOGLOBIN: 9.6 G/DL (ref 13–17.5)
LYMPH #: 0.7 X10^3/UL (ref 1–4.8)
LYMPH %: 5 % (ref 24–48)
MEAN CORPUSCULAR HEMOGLOBIN: 31 PG (ref 25–35)
MEAN CORPUSCULAR HGB CONC: 34 G/DL (ref 31–37)
MEAN CORPUSCULAR VOLUME: 92 FL (ref 79–100)
MONO #: 1.2 X10^3/UL (ref 0–1.1)
MONO %: 9 % (ref 0–9)
NEUT #: 10.4 X10^3UL (ref 1.8–7.7)
NEUT %: 83 % (ref 31–73)
PLATELET COUNT: 224 X10^3/UL (ref 140–400)
POC GLUCOSE: 129 MG/DL (ref 70–99)
POC GLUCOSE: 180 MG/DL (ref 70–99)
POTASSIUM SERPL-SCNC: 3.9 MMOL/L (ref 3.5–5.1)
RED BLOOD COUNT: 3.08 X10^6/UL (ref 4.3–5.7)
RED CELL DISTRIBUTION WIDTH: 12.7 % (ref 11.5–14.5)
SODIUM: 130 MMOL/L (ref 136–145)
TOTAL BILIRUBIN: 0.4 MG/DL (ref 0.2–1)
TOTAL PROTEIN: 5.8 G/DL (ref 6.4–8.2)

## 2018-06-16 RX ADMIN — ALBUTEROL SULFATE 1 MG: 108 AEROSOL, METERED RESPIRATORY (INHALATION) at 07:10

## 2018-06-16 RX ADMIN — FLUCONAZOLE IN SODIUM CHLORIDE 1 MLS/HR: 2 INJECTION, SOLUTION INTRAVENOUS at 12:47

## 2018-06-16 RX ADMIN — PIPERACILLIN SODIUM AND TAZOBACTAM SODIUM 1 MLS/HR: 3; .375 INJECTION, POWDER, LYOPHILIZED, FOR SOLUTION INTRAVENOUS at 17:50

## 2018-06-16 RX ADMIN — OXYCODONE HYDROCHLORIDE AND ACETAMINOPHEN 1 TAB: 5; 325 TABLET ORAL at 10:35

## 2018-06-16 RX ADMIN — Medication 1 CAP: at 22:12

## 2018-06-16 RX ADMIN — PIPERACILLIN SODIUM AND TAZOBACTAM SODIUM 1 MLS/HR: 3; .375 INJECTION, POWDER, LYOPHILIZED, FOR SOLUTION INTRAVENOUS at 06:05

## 2018-06-16 RX ADMIN — GLYCERIN, ISOLEUCINE, LEUCINE, LYSINE, METHIONINE, PHENYLALANINE, THREONINE, TRYPTOPHAN, VALINE, ALANINE, GLYCINE, ARGININE, HISTIDINE, PROLINE, SERINE, CYSTEINE, SODIUM ACETATE, MAGNESIUM ACETATE, CALCIUM ACETATE, SODIUM CHLORIDE, POTASSIUM CHLORIDE, PHOSPHORIC ACID, AND POTASSIUM METABISULFITE 1 MLS/HR
3; .21; .27; .22; .16; .17; .12; .046; .2; .21; .42; .29; .085; .34; .18; .014; .2; .054; .026; .12; .15; .041 INJECTION INTRAVENOUS at 17:15

## 2018-06-16 RX ADMIN — GLYCERIN, ISOLEUCINE, LEUCINE, LYSINE, METHIONINE, PHENYLALANINE, THREONINE, TRYPTOPHAN, VALINE, ALANINE, GLYCINE, ARGININE, HISTIDINE, PROLINE, SERINE, CYSTEINE, SODIUM ACETATE, MAGNESIUM ACETATE, CALCIUM ACETATE, SODIUM CHLORIDE, POTASSIUM CHLORIDE, PHOSPHORIC ACID, AND POTASSIUM METABISULFITE 1 MLS/HR
3; .21; .27; .22; .16; .17; .12; .046; .2; .21; .42; .29; .085; .34; .18; .014; .2; .054; .026; .12; .15; .041 INJECTION INTRAVENOUS at 03:49

## 2018-06-16 RX ADMIN — ALBUTEROL SULFATE 1 MG: 108 AEROSOL, METERED RESPIRATORY (INHALATION) at 16:54

## 2018-06-16 RX ADMIN — PANTOPRAZOLE SODIUM 1 MG: 40 INJECTION, POWDER, FOR SOLUTION INTRAVENOUS at 06:05

## 2018-06-16 RX ADMIN — PIPERACILLIN SODIUM AND TAZOBACTAM SODIUM 1 MLS/HR: 3; .375 INJECTION, POWDER, LYOPHILIZED, FOR SOLUTION INTRAVENOUS at 12:46

## 2018-06-16 RX ADMIN — OXYCODONE HYDROCHLORIDE AND ACETAMINOPHEN 1 TAB: 5; 325 TABLET ORAL at 17:58

## 2018-06-16 RX ADMIN — Medication 1 CAP: at 10:16

## 2018-06-16 RX ADMIN — TAMSULOSIN HYDROCHLORIDE 1 MG: 0.4 CAPSULE ORAL at 10:16

## 2018-06-16 RX ADMIN — ENOXAPARIN SODIUM 1 MG: 40 INJECTION SUBCUTANEOUS at 15:13

## 2018-06-16 RX ADMIN — OXYCODONE HYDROCHLORIDE AND ACETAMINOPHEN 1 TAB: 5; 325 TABLET ORAL at 22:12

## 2018-06-16 RX ADMIN — FINASTERIDE 1 MG: 5 TABLET, FILM COATED ORAL at 15:00

## 2018-06-17 LAB
POC GLUCOSE: 131 MG/DL (ref 70–99)
POC GLUCOSE: 141 MG/DL (ref 70–99)

## 2018-06-17 RX ADMIN — Medication 1 CAP: at 07:38

## 2018-06-17 RX ADMIN — Medication 1 CAP: at 20:54

## 2018-06-17 RX ADMIN — PIPERACILLIN SODIUM AND TAZOBACTAM SODIUM 1 MLS/HR: 3; .375 INJECTION, POWDER, LYOPHILIZED, FOR SOLUTION INTRAVENOUS at 17:28

## 2018-06-17 RX ADMIN — GLYCERIN, ISOLEUCINE, LEUCINE, LYSINE, METHIONINE, PHENYLALANINE, THREONINE, TRYPTOPHAN, VALINE, ALANINE, GLYCINE, ARGININE, HISTIDINE, PROLINE, SERINE, CYSTEINE, SODIUM ACETATE, MAGNESIUM ACETATE, CALCIUM ACETATE, SODIUM CHLORIDE, POTASSIUM CHLORIDE, PHOSPHORIC ACID, AND POTASSIUM METABISULFITE 1 MLS/HR
3; .21; .27; .22; .16; .17; .12; .046; .2; .21; .42; .29; .085; .34; .18; .014; .2; .054; .026; .12; .15; .041 INJECTION INTRAVENOUS at 00:15

## 2018-06-17 RX ADMIN — ENOXAPARIN SODIUM 1 MG: 40 INJECTION SUBCUTANEOUS at 16:22

## 2018-06-17 RX ADMIN — PIPERACILLIN SODIUM AND TAZOBACTAM SODIUM 1 MLS/HR: 3; .375 INJECTION, POWDER, LYOPHILIZED, FOR SOLUTION INTRAVENOUS at 12:14

## 2018-06-17 RX ADMIN — PIPERACILLIN SODIUM AND TAZOBACTAM SODIUM 1 MLS/HR: 3; .375 INJECTION, POWDER, LYOPHILIZED, FOR SOLUTION INTRAVENOUS at 06:05

## 2018-06-17 RX ADMIN — ALBUTEROL SULFATE 1 MG: 108 AEROSOL, METERED RESPIRATORY (INHALATION) at 04:13

## 2018-06-17 RX ADMIN — ALBUTEROL SULFATE 1 MG: 108 AEROSOL, METERED RESPIRATORY (INHALATION) at 20:05

## 2018-06-17 RX ADMIN — GLYCERIN, ISOLEUCINE, LEUCINE, LYSINE, METHIONINE, PHENYLALANINE, THREONINE, TRYPTOPHAN, VALINE, ALANINE, GLYCINE, ARGININE, HISTIDINE, PROLINE, SERINE, CYSTEINE, SODIUM ACETATE, MAGNESIUM ACETATE, CALCIUM ACETATE, SODIUM CHLORIDE, POTASSIUM CHLORIDE, PHOSPHORIC ACID, AND POTASSIUM METABISULFITE 1 MLS/HR
3; .21; .27; .22; .16; .17; .12; .046; .2; .21; .42; .29; .085; .34; .18; .014; .2; .054; .026; .12; .15; .041 INJECTION INTRAVENOUS at 20:59

## 2018-06-17 RX ADMIN — OXYCODONE HYDROCHLORIDE AND ACETAMINOPHEN 1 TAB: 5; 325 TABLET ORAL at 08:32

## 2018-06-17 RX ADMIN — PIPERACILLIN SODIUM AND TAZOBACTAM SODIUM 1 MLS/HR: 3; .375 INJECTION, POWDER, LYOPHILIZED, FOR SOLUTION INTRAVENOUS at 00:15

## 2018-06-17 RX ADMIN — OXYCODONE HYDROCHLORIDE AND ACETAMINOPHEN 1 TAB: 5; 325 TABLET ORAL at 16:25

## 2018-06-17 RX ADMIN — TAMSULOSIN HYDROCHLORIDE 1 MG: 0.4 CAPSULE ORAL at 07:38

## 2018-06-17 RX ADMIN — GLYCERIN, ISOLEUCINE, LEUCINE, LYSINE, METHIONINE, PHENYLALANINE, THREONINE, TRYPTOPHAN, VALINE, ALANINE, GLYCINE, ARGININE, HISTIDINE, PROLINE, SERINE, CYSTEINE, SODIUM ACETATE, MAGNESIUM ACETATE, CALCIUM ACETATE, SODIUM CHLORIDE, POTASSIUM CHLORIDE, PHOSPHORIC ACID, AND POTASSIUM METABISULFITE 1 MLS/HR
3; .21; .27; .22; .16; .17; .12; .046; .2; .21; .42; .29; .085; .34; .18; .014; .2; .054; .026; .12; .15; .041 INJECTION INTRAVENOUS at 05:45

## 2018-06-17 RX ADMIN — PANTOPRAZOLE SODIUM 1 MG: 40 INJECTION, POWDER, FOR SOLUTION INTRAVENOUS at 06:05

## 2018-06-17 RX ADMIN — FINASTERIDE 1 MG: 5 TABLET, FILM COATED ORAL at 09:00

## 2018-06-17 RX ADMIN — OXYCODONE HYDROCHLORIDE AND ACETAMINOPHEN 1 TAB: 5; 325 TABLET ORAL at 20:54

## 2018-06-18 LAB
ADD MAN DIFF?: YES
ANION GAP SERPL CALC-SCNC: 4 MMOL/L (ref 6–14)
BASO #: 0.1 X10^3/UL (ref 0–0.2)
BASO %: 1 % (ref 0–3)
BLOOD UREA NITROGEN: 26 MG/DL (ref 8–26)
CALCIUM: 8.7 MG/DL (ref 8.5–10.1)
CHLORIDE: 101 MMOL/L (ref 98–107)
CO2 SERPL-SCNC: 27 MMOL/L (ref 21–32)
CREAT SERPL-MCNC: 1.1 MG/DL (ref 0.7–1.3)
EOS #: 0.6 X10^3/UL (ref 0–0.7)
EOS %: 6 % (ref 0–3)
GFR SERPLBLD BASED ON 1.73 SQ M-ARVRAT: 64.2 ML/MIN
GLUCOSE SERPL-MCNC: 134 MG/DL (ref 70–99)
HCG SERPL-ACNC: 10.6 X10^3/UL (ref 4–11)
HEMATOCRIT: 30.1 % (ref 39–53)
HEMOGLOBIN: 10.4 G/DL (ref 13–17.5)
LYMPH #: 0.6 X10^3/UL (ref 1–4.8)
LYMPH %: 6 % (ref 24–48)
MEAN CORPUSCULAR HEMOGLOBIN: 32 PG (ref 25–35)
MEAN CORPUSCULAR HGB CONC: 35 G/DL (ref 31–37)
MEAN CORPUSCULAR VOLUME: 92 FL (ref 79–100)
MONO #: 1.1 X10^3/UL (ref 0–1.1)
MONO %: 10 % (ref 0–9)
NEUT #: 8.2 X10^3UL (ref 1.8–7.7)
NEUT %: 77 % (ref 31–73)
PLATELET COUNT: 256 X10^3/UL (ref 140–400)
POC GLUCOSE: 120 MG/DL (ref 70–99)
POC GLUCOSE: 131 MG/DL (ref 70–99)
POC GLUCOSE: 136 MG/DL (ref 70–99)
POC GLUCOSE: 137 MG/DL (ref 70–99)
POTASSIUM SERPL-SCNC: 4.3 MMOL/L (ref 3.5–5.1)
RED BLOOD COUNT: 3.27 X10^6/UL (ref 4.3–5.7)
RED CELL DISTRIBUTION WIDTH: 12.8 % (ref 11.5–14.5)
SODIUM: 132 MMOL/L (ref 136–145)

## 2018-06-18 RX ADMIN — PANTOPRAZOLE SODIUM 1 MG: 40 INJECTION, POWDER, FOR SOLUTION INTRAVENOUS at 06:02

## 2018-06-18 RX ADMIN — PIPERACILLIN SODIUM AND TAZOBACTAM SODIUM 1 MLS/HR: 3; .375 INJECTION, POWDER, LYOPHILIZED, FOR SOLUTION INTRAVENOUS at 18:33

## 2018-06-18 RX ADMIN — TAMSULOSIN HYDROCHLORIDE 1 MG: 0.4 CAPSULE ORAL at 09:33

## 2018-06-18 RX ADMIN — PANTOPRAZOLE SODIUM 1 MG: 40 INJECTION, POWDER, FOR SOLUTION INTRAVENOUS at 16:40

## 2018-06-18 RX ADMIN — ENOXAPARIN SODIUM 1 MG: 40 INJECTION SUBCUTANEOUS at 16:42

## 2018-06-18 RX ADMIN — FUROSEMIDE 1 MG: 10 INJECTION, SOLUTION INTRAMUSCULAR; INTRAVENOUS at 18:32

## 2018-06-18 RX ADMIN — FENTANYL CITRATE 1 MCG: 50 INJECTION INTRAMUSCULAR; INTRAVENOUS at 14:17

## 2018-06-18 RX ADMIN — BISACODYL 1 MG: 10 SUPPOSITORY RECTAL at 13:50

## 2018-06-18 RX ADMIN — PIPERACILLIN SODIUM AND TAZOBACTAM SODIUM 1 MLS/HR: 3; .375 INJECTION, POWDER, LYOPHILIZED, FOR SOLUTION INTRAVENOUS at 00:07

## 2018-06-18 RX ADMIN — GLYCERIN, ISOLEUCINE, LEUCINE, LYSINE, METHIONINE, PHENYLALANINE, THREONINE, TRYPTOPHAN, VALINE, ALANINE, GLYCINE, ARGININE, HISTIDINE, PROLINE, SERINE, CYSTEINE, SODIUM ACETATE, MAGNESIUM ACETATE, CALCIUM ACETATE, SODIUM CHLORIDE, POTASSIUM CHLORIDE, PHOSPHORIC ACID, AND POTASSIUM METABISULFITE 1 MLS/HR
3; .21; .27; .22; .16; .17; .12; .046; .2; .21; .42; .29; .085; .34; .18; .014; .2; .054; .026; .12; .15; .041 INJECTION INTRAVENOUS at 13:13

## 2018-06-18 RX ADMIN — FINASTERIDE 1 MG: 5 TABLET, FILM COATED ORAL at 09:32

## 2018-06-18 RX ADMIN — Medication 1 CAP: at 21:35

## 2018-06-18 RX ADMIN — PIPERACILLIN SODIUM AND TAZOBACTAM SODIUM 1 MLS/HR: 3; .375 INJECTION, POWDER, LYOPHILIZED, FOR SOLUTION INTRAVENOUS at 06:03

## 2018-06-18 RX ADMIN — GLYCERIN, ISOLEUCINE, LEUCINE, LYSINE, METHIONINE, PHENYLALANINE, THREONINE, TRYPTOPHAN, VALINE, ALANINE, GLYCINE, ARGININE, HISTIDINE, PROLINE, SERINE, CYSTEINE, SODIUM ACETATE, MAGNESIUM ACETATE, CALCIUM ACETATE, SODIUM CHLORIDE, POTASSIUM CHLORIDE, PHOSPHORIC ACID, AND POTASSIUM METABISULFITE 1 MLS/HR
3; .21; .27; .22; .16; .17; .12; .046; .2; .21; .42; .29; .085; .34; .18; .014; .2; .054; .026; .12; .15; .041 INJECTION INTRAVENOUS at 00:23

## 2018-06-18 RX ADMIN — FENTANYL CITRATE 1 MCG: 50 INJECTION INTRAMUSCULAR; INTRAVENOUS at 17:03

## 2018-06-18 RX ADMIN — PIPERACILLIN SODIUM AND TAZOBACTAM SODIUM 1 MLS/HR: 3; .375 INJECTION, POWDER, LYOPHILIZED, FOR SOLUTION INTRAVENOUS at 12:19

## 2018-06-18 RX ADMIN — Medication 1 CAP: at 09:32

## 2018-06-18 RX ADMIN — ALBUTEROL SULFATE 1 MG: 108 AEROSOL, METERED RESPIRATORY (INHALATION) at 09:00

## 2018-06-18 RX ADMIN — SIMVASTATIN 1 MG: 20 TABLET, FILM COATED ORAL at 21:35

## 2018-06-19 LAB
ADD MAN DIFF?: NO
ANION GAP SERPL CALC-SCNC: 4 MMOL/L (ref 6–14)
BASO #: 0.1 X10^3/UL (ref 0–0.2)
BASO %: 1 % (ref 0–3)
BLOOD UREA NITROGEN: 24 MG/DL (ref 8–26)
CALCIUM: 8.9 MG/DL (ref 8.5–10.1)
CHLORIDE: 99 MMOL/L (ref 98–107)
CO2 SERPL-SCNC: 30 MMOL/L (ref 21–32)
CREAT SERPL-MCNC: 1.1 MG/DL (ref 0.7–1.3)
EOS #: 0.5 X10^3/UL (ref 0–0.7)
EOS %: 5 % (ref 0–3)
GFR SERPLBLD BASED ON 1.73 SQ M-ARVRAT: 64.2 ML/MIN
GLUCOSE SERPL-MCNC: 115 MG/DL (ref 70–99)
HCG SERPL-ACNC: 9.8 X10^3/UL (ref 4–11)
HEMATOCRIT: 30.5 % (ref 39–53)
HEMOGLOBIN: 10.4 G/DL (ref 13–17.5)
LYMPH #: 0.7 X10^3/UL (ref 1–4.8)
LYMPH %: 7 % (ref 24–48)
MEAN CORPUSCULAR HEMOGLOBIN: 32 PG (ref 25–35)
MEAN CORPUSCULAR HGB CONC: 34 G/DL (ref 31–37)
MEAN CORPUSCULAR VOLUME: 92 FL (ref 79–100)
MONO #: 0.8 X10^3/UL (ref 0–1.1)
MONO %: 9 % (ref 0–9)
NEUT #: 7.7 X10^3UL (ref 1.8–7.7)
NEUT %: 78 % (ref 31–73)
PLATELET COUNT: 282 X10^3/UL (ref 140–400)
POC GLUCOSE: 116 MG/DL (ref 70–99)
POC GLUCOSE: 123 MG/DL (ref 70–99)
POC GLUCOSE: 132 MG/DL (ref 70–99)
POC GLUCOSE: 133 MG/DL (ref 70–99)
POTASSIUM SERPL-SCNC: 4.3 MMOL/L (ref 3.5–5.1)
RED BLOOD COUNT: 3.32 X10^6/UL (ref 4.3–5.7)
RED CELL DISTRIBUTION WIDTH: 12.5 % (ref 11.5–14.5)
SODIUM: 133 MMOL/L (ref 136–145)

## 2018-06-19 RX ADMIN — ASPIRIN 81 MG 1 MG: 81 TABLET ORAL at 09:39

## 2018-06-19 RX ADMIN — GLYCERIN, ISOLEUCINE, LEUCINE, LYSINE, METHIONINE, PHENYLALANINE, THREONINE, TRYPTOPHAN, VALINE, ALANINE, GLYCINE, ARGININE, HISTIDINE, PROLINE, SERINE, CYSTEINE, SODIUM ACETATE, MAGNESIUM ACETATE, CALCIUM ACETATE, SODIUM CHLORIDE, POTASSIUM CHLORIDE, PHOSPHORIC ACID, AND POTASSIUM METABISULFITE 1 MLS/HR
3; .21; .27; .22; .16; .17; .12; .046; .2; .21; .42; .29; .085; .34; .18; .014; .2; .054; .026; .12; .15; .041 INJECTION INTRAVENOUS at 03:19

## 2018-06-19 RX ADMIN — FENTANYL CITRATE 1 MCG: 50 INJECTION INTRAMUSCULAR; INTRAVENOUS at 07:30

## 2018-06-19 RX ADMIN — GLYCERIN, ISOLEUCINE, LEUCINE, LYSINE, METHIONINE, PHENYLALANINE, THREONINE, TRYPTOPHAN, VALINE, ALANINE, GLYCINE, ARGININE, HISTIDINE, PROLINE, SERINE, CYSTEINE, SODIUM ACETATE, MAGNESIUM ACETATE, CALCIUM ACETATE, SODIUM CHLORIDE, POTASSIUM CHLORIDE, PHOSPHORIC ACID, AND POTASSIUM METABISULFITE 1 MLS/HR
3; .21; .27; .22; .16; .17; .12; .046; .2; .21; .42; .29; .085; .34; .18; .014; .2; .054; .026; .12; .15; .041 INJECTION INTRAVENOUS at 22:03

## 2018-06-19 RX ADMIN — PIPERACILLIN SODIUM AND TAZOBACTAM SODIUM 1 MLS/HR: 3; .375 INJECTION, POWDER, LYOPHILIZED, FOR SOLUTION INTRAVENOUS at 05:55

## 2018-06-19 RX ADMIN — PANTOPRAZOLE SODIUM 1 MG: 40 INJECTION, POWDER, FOR SOLUTION INTRAVENOUS at 09:40

## 2018-06-19 RX ADMIN — Medication 1 CAP: at 22:03

## 2018-06-19 RX ADMIN — TAMSULOSIN HYDROCHLORIDE 1 MG: 0.4 CAPSULE ORAL at 09:39

## 2018-06-19 RX ADMIN — ENOXAPARIN SODIUM 1 MG: 40 INJECTION SUBCUTANEOUS at 16:36

## 2018-06-19 RX ADMIN — Medication 1 CAP: at 09:00

## 2018-06-19 RX ADMIN — OXYCODONE HYDROCHLORIDE AND ACETAMINOPHEN 1 TAB: 5; 325 TABLET ORAL at 22:04

## 2018-06-19 RX ADMIN — FINASTERIDE 1 MG: 5 TABLET, FILM COATED ORAL at 09:39

## 2018-06-19 RX ADMIN — PANTOPRAZOLE SODIUM 1 MG: 40 INJECTION, POWDER, FOR SOLUTION INTRAVENOUS at 16:36

## 2018-06-19 RX ADMIN — SIMVASTATIN 1 MG: 20 TABLET, FILM COATED ORAL at 22:03

## 2018-06-19 RX ADMIN — PIPERACILLIN SODIUM AND TAZOBACTAM SODIUM 1 MLS/HR: 3; .375 INJECTION, POWDER, LYOPHILIZED, FOR SOLUTION INTRAVENOUS at 00:10

## 2018-06-20 LAB
ADD MAN DIFF?: NO
ANION GAP SERPL CALC-SCNC: 5 MMOL/L (ref 6–14)
BASO #: 0.1 X10^3/UL (ref 0–0.2)
BASO %: 1 % (ref 0–3)
BLOOD UREA NITROGEN: 22 MG/DL (ref 8–26)
CALCIUM: 8.9 MG/DL (ref 8.5–10.1)
CHLORIDE: 101 MMOL/L (ref 98–107)
CO2 SERPL-SCNC: 29 MMOL/L (ref 21–32)
CREAT SERPL-MCNC: 1.1 MG/DL (ref 0.7–1.3)
EOS #: 0.5 X10^3/UL (ref 0–0.7)
EOS %: 5 % (ref 0–3)
GFR SERPLBLD BASED ON 1.73 SQ M-ARVRAT: 64.2 ML/MIN
GLUCOSE SERPL-MCNC: 117 MG/DL (ref 70–99)
HCG SERPL-ACNC: 9.2 X10^3/UL (ref 4–11)
HEMATOCRIT: 29.2 % (ref 39–53)
HEMOGLOBIN: 10 G/DL (ref 13–17.5)
LYMPH #: 1 X10^3/UL (ref 1–4.8)
LYMPH %: 11 % (ref 24–48)
MEAN CORPUSCULAR HEMOGLOBIN: 31 PG (ref 25–35)
MEAN CORPUSCULAR HGB CONC: 34 G/DL (ref 31–37)
MEAN CORPUSCULAR VOLUME: 92 FL (ref 79–100)
MONO #: 0.8 X10^3/UL (ref 0–1.1)
MONO %: 9 % (ref 0–9)
NEUT #: 6.7 X10^3UL (ref 1.8–7.7)
NEUT %: 73 % (ref 31–73)
PLATELET COUNT: 321 X10^3/UL (ref 140–400)
POC GLUCOSE: 119 MG/DL (ref 70–99)
POC GLUCOSE: 122 MG/DL (ref 70–99)
POC GLUCOSE: 123 MG/DL (ref 70–99)
POC GLUCOSE: 140 MG/DL (ref 70–99)
POTASSIUM SERPL-SCNC: 4.1 MMOL/L (ref 3.5–5.1)
RED BLOOD COUNT: 3.18 X10^6/UL (ref 4.3–5.7)
RED CELL DISTRIBUTION WIDTH: 12.8 % (ref 11.5–14.5)
SODIUM: 135 MMOL/L (ref 136–145)

## 2018-06-20 RX ADMIN — ENOXAPARIN SODIUM 1 MG: 40 INJECTION SUBCUTANEOUS at 16:33

## 2018-06-20 RX ADMIN — TAMSULOSIN HYDROCHLORIDE 1 MG: 0.4 CAPSULE ORAL at 08:15

## 2018-06-20 RX ADMIN — Medication 1 CAP: at 21:30

## 2018-06-20 RX ADMIN — FINASTERIDE 1 MG: 5 TABLET, FILM COATED ORAL at 08:15

## 2018-06-20 RX ADMIN — GLYCERIN, ISOLEUCINE, LEUCINE, LYSINE, METHIONINE, PHENYLALANINE, THREONINE, TRYPTOPHAN, VALINE, ALANINE, GLYCINE, ARGININE, HISTIDINE, PROLINE, SERINE, CYSTEINE, SODIUM ACETATE, MAGNESIUM ACETATE, CALCIUM ACETATE, SODIUM CHLORIDE, POTASSIUM CHLORIDE, PHOSPHORIC ACID, AND POTASSIUM METABISULFITE 1 MLS/HR
3; .21; .27; .22; .16; .17; .12; .046; .2; .21; .42; .29; .085; .34; .18; .014; .2; .054; .026; .12; .15; .041 INJECTION INTRAVENOUS at 10:39

## 2018-06-20 RX ADMIN — ASPIRIN 81 MG 1 MG: 81 TABLET ORAL at 08:15

## 2018-06-20 RX ADMIN — BISACODYL 1 MG: 10 SUPPOSITORY RECTAL at 08:13

## 2018-06-20 RX ADMIN — PANTOPRAZOLE SODIUM 1 MG: 40 INJECTION, POWDER, FOR SOLUTION INTRAVENOUS at 16:30

## 2018-06-20 RX ADMIN — SIMVASTATIN 1 MG: 20 TABLET, FILM COATED ORAL at 21:30

## 2018-06-20 RX ADMIN — PANTOPRAZOLE SODIUM 1 MG: 40 INJECTION, POWDER, FOR SOLUTION INTRAVENOUS at 08:15

## 2018-06-20 RX ADMIN — OXYCODONE HYDROCHLORIDE AND ACETAMINOPHEN 1 TAB: 5; 325 TABLET ORAL at 16:36

## 2018-06-20 RX ADMIN — GLYCERIN, ISOLEUCINE, LEUCINE, LYSINE, METHIONINE, PHENYLALANINE, THREONINE, TRYPTOPHAN, VALINE, ALANINE, GLYCINE, ARGININE, HISTIDINE, PROLINE, SERINE, CYSTEINE, SODIUM ACETATE, MAGNESIUM ACETATE, CALCIUM ACETATE, SODIUM CHLORIDE, POTASSIUM CHLORIDE, PHOSPHORIC ACID, AND POTASSIUM METABISULFITE 1 MLS/HR
3; .21; .27; .22; .16; .17; .12; .046; .2; .21; .42; .29; .085; .34; .18; .014; .2; .054; .026; .12; .15; .041 INJECTION INTRAVENOUS at 08:45

## 2018-06-20 RX ADMIN — OXYCODONE HYDROCHLORIDE AND ACETAMINOPHEN 1 TAB: 5; 325 TABLET ORAL at 21:31

## 2018-06-20 RX ADMIN — Medication 1 CAP: at 08:15

## 2018-06-21 LAB
POC GLUCOSE: 131 MG/DL (ref 70–99)
POC GLUCOSE: 150 MG/DL (ref 70–99)

## 2018-06-21 RX ADMIN — PANTOPRAZOLE SODIUM 1 MG: 40 TABLET, DELAYED RELEASE ORAL at 16:34

## 2018-06-21 RX ADMIN — ASPIRIN 81 MG 1 MG: 81 TABLET ORAL at 09:00

## 2018-06-21 RX ADMIN — FINASTERIDE 1 MG: 5 TABLET, FILM COATED ORAL at 09:00

## 2018-06-21 RX ADMIN — OXYCODONE HYDROCHLORIDE AND ACETAMINOPHEN 1 TAB: 5; 325 TABLET ORAL at 14:29

## 2018-06-21 RX ADMIN — OXYCODONE HYDROCHLORIDE AND ACETAMINOPHEN 1 TAB: 5; 325 TABLET ORAL at 21:06

## 2018-06-21 RX ADMIN — Medication 1 CAP: at 21:01

## 2018-06-21 RX ADMIN — PANTOPRAZOLE SODIUM 1 MG: 40 INJECTION, POWDER, FOR SOLUTION INTRAVENOUS at 07:52

## 2018-06-21 RX ADMIN — TAMSULOSIN HYDROCHLORIDE 1 MG: 0.4 CAPSULE ORAL at 07:53

## 2018-06-21 RX ADMIN — Medication 1 CAP: at 07:52

## 2018-06-21 RX ADMIN — GLYCERIN, ISOLEUCINE, LEUCINE, LYSINE, METHIONINE, PHENYLALANINE, THREONINE, TRYPTOPHAN, VALINE, ALANINE, GLYCINE, ARGININE, HISTIDINE, PROLINE, SERINE, CYSTEINE, SODIUM ACETATE, MAGNESIUM ACETATE, CALCIUM ACETATE, SODIUM CHLORIDE, POTASSIUM CHLORIDE, PHOSPHORIC ACID, AND POTASSIUM METABISULFITE 1 MLS/HR
3; .21; .27; .22; .16; .17; .12; .046; .2; .21; .42; .29; .085; .34; .18; .014; .2; .054; .026; .12; .15; .041 INJECTION INTRAVENOUS at 16:33

## 2018-06-21 RX ADMIN — OXYCODONE HYDROCHLORIDE AND ACETAMINOPHEN 1 TAB: 5; 325 TABLET ORAL at 02:16

## 2018-06-21 RX ADMIN — ENOXAPARIN SODIUM 1 MG: 40 INJECTION SUBCUTANEOUS at 16:34

## 2018-06-21 RX ADMIN — SIMVASTATIN 1 MG: 20 TABLET, FILM COATED ORAL at 21:01

## 2018-06-21 RX ADMIN — GLYCERIN, ISOLEUCINE, LEUCINE, LYSINE, METHIONINE, PHENYLALANINE, THREONINE, TRYPTOPHAN, VALINE, ALANINE, GLYCINE, ARGININE, HISTIDINE, PROLINE, SERINE, CYSTEINE, SODIUM ACETATE, MAGNESIUM ACETATE, CALCIUM ACETATE, SODIUM CHLORIDE, POTASSIUM CHLORIDE, PHOSPHORIC ACID, AND POTASSIUM METABISULFITE 1 MLS/HR
3; .21; .27; .22; .16; .17; .12; .046; .2; .21; .42; .29; .085; .34; .18; .014; .2; .054; .026; .12; .15; .041 INJECTION INTRAVENOUS at 04:05

## 2018-06-22 LAB
ADD MAN DIFF?: NO
ANION GAP SERPL CALC-SCNC: 6 MMOL/L (ref 6–14)
BASO #: 0.1 X10^3/UL (ref 0–0.2)
BASO %: 1 % (ref 0–3)
BLOOD UREA NITROGEN: 19 MG/DL (ref 8–26)
CALCIUM: 9.1 MG/DL (ref 8.5–10.1)
CHLORIDE: 101 MMOL/L (ref 98–107)
CO2 SERPL-SCNC: 30 MMOL/L (ref 21–32)
CREAT SERPL-MCNC: 1.2 MG/DL (ref 0.7–1.3)
EOS #: 0.5 X10^3/UL (ref 0–0.7)
EOS %: 4 % (ref 0–3)
GFR SERPLBLD BASED ON 1.73 SQ M-ARVRAT: 58.1 ML/MIN
GLUCOSE SERPL-MCNC: 112 MG/DL (ref 70–99)
HCG SERPL-ACNC: 11.7 X10^3/UL (ref 4–11)
HEMATOCRIT: 28.1 % (ref 39–53)
HEMOGLOBIN: 9.9 G/DL (ref 13–17.5)
LYMPH #: 1.3 X10^3/UL (ref 1–4.8)
LYMPH %: 12 % (ref 24–48)
MEAN CORPUSCULAR HEMOGLOBIN: 32 PG (ref 25–35)
MEAN CORPUSCULAR HGB CONC: 35 G/DL (ref 31–37)
MEAN CORPUSCULAR VOLUME: 92 FL (ref 79–100)
MONO #: 0.9 X10^3/UL (ref 0–1.1)
MONO %: 8 % (ref 0–9)
NEUT #: 8.8 X10^3UL (ref 1.8–7.7)
NEUT %: 75 % (ref 31–73)
PLATELET COUNT: 378 X10^3/UL (ref 140–400)
POTASSIUM SERPL-SCNC: 4.3 MMOL/L (ref 3.5–5.1)
RED BLOOD COUNT: 3.06 X10^6/UL (ref 4.3–5.7)
RED CELL DISTRIBUTION WIDTH: 12.7 % (ref 11.5–14.5)
SODIUM: 137 MMOL/L (ref 136–145)

## 2018-06-22 RX ADMIN — OXYCODONE HYDROCHLORIDE AND ACETAMINOPHEN 1 TAB: 5; 325 TABLET ORAL at 08:28

## 2018-06-22 RX ADMIN — ASPIRIN 81 MG 1 MG: 81 TABLET ORAL at 08:18

## 2018-06-22 RX ADMIN — PANTOPRAZOLE SODIUM 1 MG: 40 TABLET, DELAYED RELEASE ORAL at 08:19

## 2018-06-22 RX ADMIN — Medication 1 CAP: at 08:19

## 2018-06-22 RX ADMIN — TAMSULOSIN HYDROCHLORIDE 1 MG: 0.4 CAPSULE ORAL at 08:18

## 2018-06-22 RX ADMIN — GLYCERIN, ISOLEUCINE, LEUCINE, LYSINE, METHIONINE, PHENYLALANINE, THREONINE, TRYPTOPHAN, VALINE, ALANINE, GLYCINE, ARGININE, HISTIDINE, PROLINE, SERINE, CYSTEINE, SODIUM ACETATE, MAGNESIUM ACETATE, CALCIUM ACETATE, SODIUM CHLORIDE, POTASSIUM CHLORIDE, PHOSPHORIC ACID, AND POTASSIUM METABISULFITE 1 MLS/HR
3; .21; .27; .22; .16; .17; .12; .046; .2; .21; .42; .29; .085; .34; .18; .014; .2; .054; .026; .12; .15; .041 INJECTION INTRAVENOUS at 04:30

## 2018-06-22 RX ADMIN — FINASTERIDE 1 MG: 5 TABLET, FILM COATED ORAL at 09:00

## 2018-08-10 ENCOUNTER — HOSPITAL ENCOUNTER (OUTPATIENT)
Dept: HOSPITAL 61 - ENDOS | Age: 83
Discharge: HOME | End: 2018-08-10
Attending: INTERNAL MEDICINE
Payer: MEDICARE

## 2018-08-10 VITALS — DIASTOLIC BLOOD PRESSURE: 63 MMHG | SYSTOLIC BLOOD PRESSURE: 95 MMHG

## 2018-08-10 DIAGNOSIS — Z98.52: ICD-10-CM

## 2018-08-10 DIAGNOSIS — Z87.891: ICD-10-CM

## 2018-08-10 DIAGNOSIS — N18.3: ICD-10-CM

## 2018-08-10 DIAGNOSIS — N40.0: ICD-10-CM

## 2018-08-10 DIAGNOSIS — J44.1: ICD-10-CM

## 2018-08-10 DIAGNOSIS — Z98.890: ICD-10-CM

## 2018-08-10 DIAGNOSIS — Z87.11: ICD-10-CM

## 2018-08-10 DIAGNOSIS — I71.4: ICD-10-CM

## 2018-08-10 DIAGNOSIS — K26.5: Primary | ICD-10-CM

## 2018-08-10 DIAGNOSIS — I12.9: ICD-10-CM

## 2018-08-10 DIAGNOSIS — Z98.42: ICD-10-CM

## 2018-08-10 DIAGNOSIS — E78.00: ICD-10-CM

## 2018-08-10 DIAGNOSIS — Z72.89: ICD-10-CM

## 2018-08-10 DIAGNOSIS — E11.22: ICD-10-CM

## 2018-08-10 DIAGNOSIS — Z96.1: ICD-10-CM

## 2018-08-10 DIAGNOSIS — Z79.82: ICD-10-CM

## 2018-08-10 DIAGNOSIS — Z87.81: ICD-10-CM

## 2018-08-10 DIAGNOSIS — M19.90: ICD-10-CM

## 2018-08-10 DIAGNOSIS — Z98.41: ICD-10-CM

## 2018-08-10 DIAGNOSIS — Z86.010: ICD-10-CM

## 2018-08-10 DIAGNOSIS — K29.50: ICD-10-CM

## 2018-08-10 DIAGNOSIS — Z82.49: ICD-10-CM

## 2018-08-10 DIAGNOSIS — Z79.899: ICD-10-CM

## 2018-08-10 DIAGNOSIS — Z79.84: ICD-10-CM

## 2018-08-10 PROCEDURE — 43235 EGD DIAGNOSTIC BRUSH WASH: CPT

## 2018-08-10 PROCEDURE — 82962 GLUCOSE BLOOD TEST: CPT

## 2018-08-10 NOTE — HP
ADMIT DATE:  08/10/2018



UPDATED HISTORY AND PHYSICAL



REFERRING PHYSICIAN:  Dr. Giovani Mojica.



HISTORY OF PRESENT ILLNESS:  An 83-year-old  male whose past medical

history is significant for hypertension, hyperlipidemia, osteoarthrosis and

history of duodenal ulcer, status post ulcer oversew back in June is here for

endoscopy.  He has had persistent early satiety and fullness since the surgery. 

Denies any melena and/or hematochezia.  He had been taking NSAIDS previously and

with the continued issues, he is here today.



PAST MEDICAL HISTORY:  Hypertension, hyperlipidemia, osteoarthrosis, status post

hip fracture and history of AAA.



PAST SURGICAL HISTORY:  Status post duodenal ulcer repair, history of colonic

polyp resections and vasectomy.



MEDICATIONS:  Include vitamin C, aspirin, vitamin B12, lisinopril,

hydrochlorothiazide, magnesium, Zofran, oxycodone, simvastatin and pantoprazole.



SOCIAL HISTORY:  He is social drinker and former smoker.



FAMILY HISTORY:  Noncontributory.



REVIEW OF SYSTEMS:  Per records.



PHYSICAL EXAMINATION:

GENERAL:  Reveals a well-nourished, well-developed  male.

VITAL SIGNS:  Temperature is 98.5, pulse 85 and respirations 18.

HEENT EXAMINATION:  Normocephalic, atraumatic.  Pupils and extraocular muscles

not tested.  Sclerae anicteric.

NECK:  Supple.

LUNGS:  Clear.

CARDIOVASCULAR EXAMINATION:  Reveals S1 and S2, without S3, S4 or appreciable

murmur.

ABDOMEN:  Exam reveals a soft abdomen.  Normoactive bowel sounds, with an intact

epigastric incision.

EXTREMITIES:  Exam reveals no cyanosis, clubbing or edema.



IMPRESSION AND PLAN:  History of duodenal ulcer perforation, status post

oversew.  Upper endoscopy to assess for gastric outlet obstruction and/or

duodenal stricture is recommended.  Risks and benefits of the procedure have

been discussed with the patient.  He is willing to proceed at this time.

 



______________________________

OTIS GALVAN MD DR:  SSP/danni  JOB#:  [id number] / [id number]

DD:  08/10/2018 07:19  DT:  08/10/2018 13:44

## 2018-08-16 ENCOUNTER — HOSPITAL ENCOUNTER (INPATIENT)
Dept: HOSPITAL 61 - ER | Age: 83
LOS: 4 days | Discharge: HOME | DRG: 683 | End: 2018-08-20
Attending: FAMILY MEDICINE | Admitting: FAMILY MEDICINE
Payer: MEDICARE

## 2018-08-16 VITALS — SYSTOLIC BLOOD PRESSURE: 127 MMHG | DIASTOLIC BLOOD PRESSURE: 70 MMHG

## 2018-08-16 VITALS — HEIGHT: 72 IN | WEIGHT: 185 LBS | BODY MASS INDEX: 25.06 KG/M2

## 2018-08-16 DIAGNOSIS — I12.9: ICD-10-CM

## 2018-08-16 DIAGNOSIS — E11.22: ICD-10-CM

## 2018-08-16 DIAGNOSIS — R35.1: ICD-10-CM

## 2018-08-16 DIAGNOSIS — E44.1: ICD-10-CM

## 2018-08-16 DIAGNOSIS — I95.9: ICD-10-CM

## 2018-08-16 DIAGNOSIS — N17.9: Primary | ICD-10-CM

## 2018-08-16 DIAGNOSIS — N40.1: ICD-10-CM

## 2018-08-16 DIAGNOSIS — N18.3: ICD-10-CM

## 2018-08-16 DIAGNOSIS — Z98.52: ICD-10-CM

## 2018-08-16 DIAGNOSIS — Z82.49: ICD-10-CM

## 2018-08-16 DIAGNOSIS — Z87.11: ICD-10-CM

## 2018-08-16 DIAGNOSIS — Z79.899: ICD-10-CM

## 2018-08-16 DIAGNOSIS — Z79.4: ICD-10-CM

## 2018-08-16 DIAGNOSIS — Z87.891: ICD-10-CM

## 2018-08-16 DIAGNOSIS — D63.1: ICD-10-CM

## 2018-08-16 DIAGNOSIS — I71.4: ICD-10-CM

## 2018-08-16 DIAGNOSIS — E86.0: ICD-10-CM

## 2018-08-16 LAB
ALBUMIN SERPL-MCNC: 3.1 G/DL (ref 3.4–5)
ALBUMIN/GLOB SERPL: 0.8 {RATIO} (ref 1–1.7)
ALP SERPL-CCNC: 66 U/L (ref 46–116)
ALT SERPL-CCNC: 22 U/L (ref 16–63)
ANION GAP SERPL CALC-SCNC: 7 MMOL/L (ref 6–14)
APTT PPP: YELLOW S
AST SERPL-CCNC: 13 U/L (ref 15–37)
BACTERIA #/AREA URNS HPF: 0 /HPF
BASOPHILS # BLD AUTO: 0.2 X10^3/UL (ref 0–0.2)
BASOPHILS NFR BLD: 2 % (ref 0–3)
BILIRUB SERPL-MCNC: 0.2 MG/DL (ref 0.2–1)
BILIRUB UR QL STRIP: NEGATIVE
BUN SERPL-MCNC: 63 MG/DL (ref 8–26)
BUN/CREAT SERPL: 15 (ref 6–20)
CALCIUM SERPL-MCNC: 8.9 MG/DL (ref 8.5–10.1)
CHLORIDE SERPL-SCNC: 106 MMOL/L (ref 98–107)
CO2 SERPL-SCNC: 25 MMOL/L (ref 21–32)
CREAT SERPL-MCNC: 4.2 MG/DL (ref 0.7–1.3)
EOSINOPHIL NFR BLD: 0.5 X10^3/UL (ref 0–0.7)
EOSINOPHIL NFR BLD: 5 % (ref 0–3)
ERYTHROCYTE [DISTWIDTH] IN BLOOD BY AUTOMATED COUNT: 14.4 % (ref 11.5–14.5)
FIBRINOGEN PPP-MCNC: CLEAR MG/DL
GFR SERPLBLD BASED ON 1.73 SQ M-ARVRAT: 13.7 ML/MIN
GLOBULIN SER-MCNC: 4.1 G/DL (ref 2.2–3.8)
GLUCOSE SERPL-MCNC: 115 MG/DL (ref 70–99)
HCT VFR BLD CALC: 27.5 % (ref 39–53)
HGB BLD-MCNC: 9.3 G/DL (ref 13–17.5)
LYMPHOCYTES # BLD: 1.3 X10^3/UL (ref 1–4.8)
LYMPHOCYTES NFR BLD AUTO: 13 % (ref 24–48)
MCH RBC QN AUTO: 30 PG (ref 25–35)
MCHC RBC AUTO-ENTMCNC: 34 G/DL (ref 31–37)
MCV RBC AUTO: 87 FL (ref 79–100)
MONO #: 0.8 X10^3/UL (ref 0–1.1)
MONOCYTES NFR BLD: 8 % (ref 0–9)
NEUT #: 7.6 X10^3UL (ref 1.8–7.7)
NEUTROPHILS NFR BLD AUTO: 73 % (ref 31–73)
NITRITE UR QL STRIP: NEGATIVE
PH UR STRIP: 6 [PH]
PLATELET # BLD AUTO: 330 X10^3/UL (ref 140–400)
POTASSIUM SERPL-SCNC: 4.5 MMOL/L (ref 3.5–5.1)
PROT SERPL-MCNC: 7.2 G/DL (ref 6.4–8.2)
PROT UR STRIP-MCNC: NEGATIVE MG/DL
RBC # BLD AUTO: 3.15 X10^6/UL (ref 4.3–5.7)
RBC #/AREA URNS HPF: (no result) /HPF (ref 0–2)
SODIUM SERPL-SCNC: 138 MMOL/L (ref 136–145)
UROBILINOGEN UR-MCNC: 0.2 MG/DL
WBC # BLD AUTO: 10.5 X10^3/UL (ref 4–11)
WBC #/AREA URNS HPF: (no result) /HPF (ref 0–4)

## 2018-08-16 PROCEDURE — 36415 COLL VENOUS BLD VENIPUNCTURE: CPT

## 2018-08-16 PROCEDURE — 80048 BASIC METABOLIC PNL TOTAL CA: CPT

## 2018-08-16 PROCEDURE — 76770 US EXAM ABDO BACK WALL COMP: CPT

## 2018-08-16 PROCEDURE — 81001 URINALYSIS AUTO W/SCOPE: CPT

## 2018-08-16 PROCEDURE — 80053 COMPREHEN METABOLIC PANEL: CPT

## 2018-08-16 PROCEDURE — 85025 COMPLETE CBC W/AUTO DIFF WBC: CPT

## 2018-08-16 RX ADMIN — TAMSULOSIN HYDROCHLORIDE SCH MG: 0.4 CAPSULE ORAL at 23:23

## 2018-08-16 RX ADMIN — BACITRACIN SCH MLS/HR: 5000 INJECTION, POWDER, FOR SOLUTION INTRAMUSCULAR at 23:24

## 2018-08-16 RX ADMIN — FINASTERIDE SCH MG: 5 TABLET, FILM COATED ORAL at 23:23

## 2018-08-16 NOTE — PHYS DOC
Past Medical History


Past Medical History:  Other


Additional Past Medical Histor:  AAA, RENAL CYST


Past Surgical History:  No Surgical History


Alcohol Use:  Occasionally


Drug Use:  None





Adult General


Chief Complaint


Chief Complaint:  ABNORMAL LABS





[facility]





Patient is a 83  year old male presents to the ED complaining of abnormal lab 

values times. States in June he had a perforated duodenal ulcer. States he has 

been following up with her Dr. Roldan (PCP) outpatient and has been having 

elevated kidney function levels. States he was called by Dr. Roldan today to 

come to the ED because his last blood test was double the first one. Denies any 

symptoms at this time.





Review of Systems


Review of Systems





Constitutional: Denies fever or chills []


Eyes: Denies change in visual acuity, redness, or eye pain []


HENT: Denies nasal congestion or sore throat []


Respiratory: Denies cough or shortness of breath []


Cardiovascular: No additional information not addressed in HPI []


GI: Denies n/v, abdominal pain, bloody stools or diarrhea []


: Denies dysuria or hematuria []


Musculoskeletal: Denies back pain or joint pain []


Integument: Denies rash or skin lesions []


Neurologic: Denies headache, focal weakness or sensory changes []





All other systems were reviewed and found to be within normal limits, except as 

documented in this note.





Current Medications


Current Medications








Allergies


Allergies





Allergies








Coded Allergies Type Severity Reaction Last Updated Verified


 


  No Known Drug Allergies    8/10/18 No











Physical Exam


Physical Exam





Constitutional: Well developed, well nourished, no acute distress, non-toxic 

appearance. []


HENT: Normocephalic, atraumatic


Neck: Normal range of motion, no tenderness, supple, no stridor. [] 


Cardiovascular:Heart rate regular rhythm, no murmur []


Lungs & Thorax:  Bilateral breath sounds clear to auscultation []


Abdomen: Bowel sounds normal, soft, no tenderness, no masses, no pulsatile 

masses. [] 


Skin: Warm, dry, no erythema, no rash. [] 


Back: No tenderness, no CVA tenderness. [] 


Extremities: No tenderness, no cyanosis, no clubbing, ROM intact, no edema. [] 


Neurologic: Alert and oriented X 3, normal motor function, normal sensory 

function, no focal deficits noted. []


Psychologic: Affect normal, judgement normal, mood normal. []





Current Patient Data


Vital Signs





 Vital Signs








  Date Time  Temp Pulse Resp B/P (MAP) Pulse Ox O2 Delivery O2 Flow Rate FiO2


 


8/16/18 21:00  74 21 126/64 (84) 98 Room Air  


 


8/16/18 19:08 98.2       





 98.2       








Lab Values





 Laboratory Tests








Test


 8/16/18


19:15 8/16/18


20:57


 


White Blood Count


 10.5 x10^3/uL


(4.0-11.0) 





 


Red Blood Count


 3.15 x10^6/uL


(4.30-5.70)  L 





 


Hemoglobin


 9.3 g/dL


(13.0-17.5)  L 





 


Hematocrit


 27.5 %


(39.0-53.0)  L 





 


Mean Corpuscular Volume


 87 fL ()


 





 


Mean Corpuscular Hemoglobin 30 pg (25-35)   


 


Mean Corpuscular Hemoglobin


Concent 34 g/dL


(31-37) 





 


Red Cell Distribution Width


 14.4 %


(11.5-14.5) 





 


Platelet Count


 330 x10^3/uL


(140-400) 





 


Neutrophils (%) (Auto) 73 % (31-73)   


 


Lymphocytes (%) (Auto) 13 % (24-48)  L 


 


Monocytes (%) (Auto) 8 % (0-9)   


 


Eosinophils (%) (Auto) 5 % (0-3)  H 


 


Basophils (%) (Auto) 2 % (0-3)   


 


Neutrophils # (Auto)


 7.6 x10^3uL


(1.8-7.7) 





 


Lymphocytes # (Auto)


 1.3 x10^3/uL


(1.0-4.8) 





 


Monocytes # (Auto)


 0.8 x10^3/uL


(0.0-1.1) 





 


Eosinophils # (Auto)


 0.5 x10^3/uL


(0.0-0.7) 





 


Basophils # (Auto)


 0.2 x10^3/uL


(0.0-0.2) 





 


Sodium Level


 138 mmol/L


(136-145) 





 


Potassium Level


 4.5 mmol/L


(3.5-5.1) 





 


Chloride Level


 106 mmol/L


() 





 


Carbon Dioxide Level


 25 mmol/L


(21-32) 





 


Anion Gap 7 (6-14)   


 


Blood Urea Nitrogen


 63 mg/dL


(8-26)  H 





 


Creatinine


 4.2 mg/dL


(0.7-1.3)  H 





 


Estimated GFR


(Cockcroft-Gault) 13.7  


 





 


BUN/Creatinine Ratio 15 (6-20)   


 


Glucose Level


 115 mg/dL


(70-99)  H 





 


Calcium Level


 8.9 mg/dL


(8.5-10.1) 





 


Total Bilirubin


 0.2 mg/dL


(0.2-1.0) 





 


Aspartate Amino Transferase


(AST) 13 U/L (15-37)


L 





 


Alanine Aminotransferase (ALT)


 22 U/L (16-63)


 





 


Alkaline Phosphatase


 66 U/L


() 





 


Total Protein


 7.2 g/dL


(6.4-8.2) 





 


Albumin


 3.1 g/dL


(3.4-5.0)  L 





 


Albumin/Globulin Ratio


 0.8 (1.0-1.7)


L 





 


Urine Collection Type  Unknown  


 


Urine Color  Yellow  


 


Urine Clarity  Clear  


 


Urine pH  6.0  


 


Urine Specific Gravity  1.010  


 


Urine Protein


 


 Negative mg/dL


(NEG-TRACE)


 


Urine Glucose (UA)


 


 Negative mg/dL


(NEG)


 


Urine Ketones (Stick)


 


 Negative mg/dL


(NEG)


 


Urine Blood


 


 Negative (NEG)





 


Urine Nitrite


 


 Negative (NEG)





 


Urine Bilirubin


 


 Negative (NEG)





 


Urine Urobilinogen Dipstick


 


 0.2 mg/dL (0.2


mg/dL)


 


Urine Leukocyte Esterase


 


 Moderate (NEG)





 


Urine RBC


 


 Occ /HPF (0-2)





 


Urine WBC


 


 20-40 /HPF


(0-4)


 


Urine Bacteria


 


 0 /HPF (0-FEW)








 Laboratory Tests


8/16/18 19:15








 Laboratory Tests


8/16/18 19:15














EKG


EKG


[]





Radiology/Procedures


Radiology/Procedures


[]





Course & Med Decision Making


Course & Med Decision Making


Pertinent Labs and Imaging studies reviewed. (See chart for details)





Will give 1 liter of fluids in the ED. Patient resting comfortably. No 

complaints at this time.


[]Discussed case with Doctor covering for patients PCP (Yuli), Dr. Gill.  

Agrees to admission and further management patient. Patient stable for 

admission.





Dragon Disclaimer


Dragon Disclaimer


This electronic medical record was generated, in whole or in part, using a 

voice recognition dictation system.





Departure


Departure


Impression:  


 Primary Impression:  


 GLORIA (acute kidney injury)


Disposition:  09 ADMITTED AS INPATIENT


Admitting Physician:  Liam Roldan


Condition:  STABLE


Referrals:  


LIAM ROLDAN MD (PCP)











LIGIA WEBBER Aug 16, 2018 20:08

## 2018-08-17 VITALS — SYSTOLIC BLOOD PRESSURE: 120 MMHG | DIASTOLIC BLOOD PRESSURE: 68 MMHG

## 2018-08-17 VITALS — SYSTOLIC BLOOD PRESSURE: 115 MMHG | DIASTOLIC BLOOD PRESSURE: 63 MMHG

## 2018-08-17 VITALS — DIASTOLIC BLOOD PRESSURE: 73 MMHG | SYSTOLIC BLOOD PRESSURE: 114 MMHG

## 2018-08-17 VITALS — SYSTOLIC BLOOD PRESSURE: 133 MMHG | DIASTOLIC BLOOD PRESSURE: 82 MMHG

## 2018-08-17 VITALS — DIASTOLIC BLOOD PRESSURE: 69 MMHG | SYSTOLIC BLOOD PRESSURE: 114 MMHG

## 2018-08-17 VITALS — SYSTOLIC BLOOD PRESSURE: 123 MMHG | DIASTOLIC BLOOD PRESSURE: 70 MMHG

## 2018-08-17 LAB
ALBUMIN SERPL-MCNC: 2.5 G/DL (ref 3.4–5)
ALBUMIN/GLOB SERPL: 0.7 {RATIO} (ref 1–1.7)
ALP SERPL-CCNC: 58 U/L (ref 46–116)
ALT SERPL-CCNC: 22 U/L (ref 16–63)
ANION GAP SERPL CALC-SCNC: 8 MMOL/L (ref 6–14)
AST SERPL-CCNC: 11 U/L (ref 15–37)
BASOPHILS # BLD AUTO: 0.2 X10^3/UL (ref 0–0.2)
BASOPHILS NFR BLD: 2 % (ref 0–3)
BILIRUB SERPL-MCNC: 0.2 MG/DL (ref 0.2–1)
BUN SERPL-MCNC: 56 MG/DL (ref 8–26)
BUN/CREAT SERPL: 15 (ref 6–20)
CALCIUM SERPL-MCNC: 8.2 MG/DL (ref 8.5–10.1)
CHLORIDE SERPL-SCNC: 110 MMOL/L (ref 98–107)
CO2 SERPL-SCNC: 24 MMOL/L (ref 21–32)
CREAT SERPL-MCNC: 3.8 MG/DL (ref 0.7–1.3)
EOSINOPHIL NFR BLD: 0.6 X10^3/UL (ref 0–0.7)
EOSINOPHIL NFR BLD: 8 % (ref 0–3)
ERYTHROCYTE [DISTWIDTH] IN BLOOD BY AUTOMATED COUNT: 14 % (ref 11.5–14.5)
GFR SERPLBLD BASED ON 1.73 SQ M-ARVRAT: 15.4 ML/MIN
GLOBULIN SER-MCNC: 3.5 G/DL (ref 2.2–3.8)
GLUCOSE SERPL-MCNC: 111 MG/DL (ref 70–99)
HCT VFR BLD CALC: 24.9 % (ref 39–53)
HGB BLD-MCNC: 8.4 G/DL (ref 13–17.5)
LYMPHOCYTES # BLD: 1.1 X10^3/UL (ref 1–4.8)
LYMPHOCYTES NFR BLD AUTO: 13 % (ref 24–48)
MCH RBC QN AUTO: 29 PG (ref 25–35)
MCHC RBC AUTO-ENTMCNC: 34 G/DL (ref 31–37)
MCV RBC AUTO: 87 FL (ref 79–100)
MONO #: 0.7 X10^3/UL (ref 0–1.1)
MONOCYTES NFR BLD: 9 % (ref 0–9)
NEUT #: 5.8 X10^3UL (ref 1.8–7.7)
NEUTROPHILS NFR BLD AUTO: 69 % (ref 31–73)
PLATELET # BLD AUTO: 289 X10^3/UL (ref 140–400)
POTASSIUM SERPL-SCNC: 4.2 MMOL/L (ref 3.5–5.1)
PROT SERPL-MCNC: 6 G/DL (ref 6.4–8.2)
RBC # BLD AUTO: 2.86 X10^6/UL (ref 4.3–5.7)
SODIUM SERPL-SCNC: 142 MMOL/L (ref 136–145)
WBC # BLD AUTO: 8.4 X10^3/UL (ref 4–11)

## 2018-08-17 RX ADMIN — BACITRACIN SCH MLS/HR: 5000 INJECTION, POWDER, FOR SOLUTION INTRAMUSCULAR at 08:41

## 2018-08-17 RX ADMIN — TAMSULOSIN HYDROCHLORIDE SCH MG: 0.4 CAPSULE ORAL at 21:03

## 2018-08-17 RX ADMIN — FINASTERIDE SCH MG: 5 TABLET, FILM COATED ORAL at 21:03

## 2018-08-17 RX ADMIN — Medication SCH MG: at 08:39

## 2018-08-17 RX ADMIN — MULTIPLE VITAMINS W/ MINERALS TAB SCH TAB: TAB at 08:39

## 2018-08-17 RX ADMIN — BACITRACIN SCH MLS/HR: 5000 INJECTION, POWDER, FOR SOLUTION INTRAMUSCULAR at 21:03

## 2018-08-17 RX ADMIN — SIMVASTATIN SCH MG: 20 TABLET, FILM COATED ORAL at 21:03

## 2018-08-17 RX ADMIN — PANTOPRAZOLE SODIUM SCH MG: 40 TABLET, DELAYED RELEASE ORAL at 17:54

## 2018-08-17 NOTE — RAD
Renal ultrasound 8/17/2018

 

INDICATION: Acute renal failure

 

COMPARISON: CT of the abdomen and pelvis June 12, 2018

 

FINDINGS: Ultrasound evaluation of the kidneys was performed. Static 

images are sent to PACS. The right kidney measures 13.8 cm in length. Left

kidney measures 14.3 cm in length. There are bilateral renal cysts, 

similar to prior CT exam. Largest cyst on the right measures approximately

4 cm, similar to comparison study. Largest cyst on the right is difficult 

to visualize completely by ultrasound measures approximately 14 cm in 

diameter. Findings are similar to prior CT. No evidence of hydronephrosis,

nephrolithiasis, or acute or active uropathy is identified. Visualized 

bladder is grossly unremarkable. The prostate is somewhat irregular in 

appearance an enlarged measuring approximately 43 cm in volume.

 

IMPRESSION:

1. Multiple bilateral renal cysts similar to prior studies. No acute renal

findings are identified by ultrasound

 

2. Prostamegaly

 

Electronically signed by: Gonzalo Azevedo MD (8/17/2018 4:57 PM) [id number]

## 2018-08-17 NOTE — PDOC2
CONSULT


Date of Consult


Date of Consult


DATE: 8/17/18 


TIME: 10:37





Reason for Consult


Reason for Consult:


GLORIA





Referring Physician


Referring Physician:


YVONNE





Identification/Chief Complaint


Chief Complaint


DIZZY AND ABNORMAL LABS





Source


Source:  Chart review, Patient





History of Present Illness


Reason for Visit:


THIS IS AN 83 YR OLD WITH GLORIA. OP LABS SHOWED A CR OF ABOUT 4.2. CR HAS BEEN 

PROGRESSIVELY RISING LAST COUPLE WEEKS. 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 

ABOUT 1.5. HAD GLORIA IN JUNE AFTER SOME EXPOSURE TO CONTRAST BUT RECOVERED WITH 

BASELINE CR OF ABOUT 1.5 AT TIME OF D/C IN JUNE. NO NEPHROTOXIN EXPOSURE 

RECENTLY NOTED. UO GOOD AND NO PROBLEMS EMPTYING HIS BLADDER





Past Medical History


Cardiovascular:  HTN, Other


Renal/:  Chronic renal insuff


Endocrine:  Diabetes





Family History


Family History:  Hypertension, Other





Social History


Social History:  Parent


ALCOHOL:  rare


Drugs:  None


Lives:  with Family





Current Problem List


Problem List


Problems


Medical Problems:


(1) GLORIA (acute kidney injury)


Status: Acute  











Current Medications


Current Medications





Current Medications


Sodium Chloride 1,000 ml @  1,000 mls/hr 1X  ONCE IV  Last administered on 8/16/ 18at 21:09;  Start 8/16/18 at 21:15;  Stop 8/16/18 at 22:14;  Status DC


Ondansetron HCl (Zofran) 4 mg PRN Q8HRS  PRN IV NAUSEA/VOMITING;  Start 8/16/18 

at 21:15;  Stop 8/17/18 at 21:14


Fentanyl Citrate (Fentanyl 2ml Vial) 50 mcg PRN Q1HR  PRN IV PAIN;  Start 8/16/ 18 at 21:15;  Stop 8/17/18 at 08:16;  Status DC


Acetaminophen (Tylenol) 650 mg PRN Q4HRS  PRN PO FEVER;  Start 8/16/18 at 21:15

;  Stop 8/17/18 at 21:14


Finasteride (Proscar) 5 mg HS PO  Last administered on 8/16/18at 23:23;  Start 8 /16/18 at 23:30


Ondansetron HCl (Zofran Odt) 4 mg PRN Q4HRS  PRN PO VOMITING 1ST CHOICE;  Start 

8/16/18 at 23:15


Tamsulosin HCl (Flomax) 0.4 mg HS PO  Last administered on 8/16/18at 23:23;  

Start 8/16/18 at 23:30


Multivitamins (Thera M Plus) 1 tab DAILY PO  Last administered on 8/17/18at 08:

39;  Start 8/17/18 at 09:00


Fish Oil (Fish Oil) 1,000 mg DAILY PO  Last administered on 8/17/18at 08:39;  

Start 8/17/18 at 09:00


Simvastatin (Zocor) 20 mg HS PO ;  Start 8/17/18 at 21:00


Pantoprazole Sodium (Protonix) 40 mg BIDAC PO ;  Start 8/17/18 at 07:30;  Stop 8 /17/18 at 07:30;  Status DC


Sodium Chloride 1,000 ml @  100 mls/hr Q10H IV  Last administered on 8/17/18at 

08:41;  Start 8/16/18 at 23:30


Pantoprazole Sodium (Protonix) 40 mg DAILYBFRSUP PO ;  Start 8/17/18 at 17:00





Active Scripts


Active


[Pantoprazole] 40 MG Tablet.dr 40 Mg PO BIDAC


Zofran Odt (Ondansetron) 4 Mg Tab.rapdis 1 Tab SL Q4HRS PRN


Reported


Multi-Vitamin Daily (Multivitamin) 1 Each Tablet 1 Each PO DAILY


Finasteride 5 Mg Tablet 1 Tab PO HS


Flomax (Tamsulosin Hcl) 0.4 Mg Cap.er.24h 1 Cap PO HS


Fish Oil Omega-3 EC 1,200 mg (Omega-3/Dha/Epa/Fish Oil) 1 Each Capsule.dr 1 

Each PO DAILY


Simvastatin 20 Mg Tablet 1 Tab PO DAILY





Allergies


Allergies:  


Coded Allergies:  


     No Known Drug Allergies (Unverified , 8/10/18)





ROS


General:  YES: Fatigue, Malaise


PSYCHOLOGICAL ROS:  YES: Anxiety


Eyes:  Yes Decreased vision


HEENT:  YES: [facility]


ALLERGY AND IMMUNOLOGY:  YES: Seasonal Allergies


Respiratory:  YES: Cough


Cardiovascular:  yes Lt Headedness


Gastrointestinal:  Yes Constipation


Genitourinary:  YES Frequency


Musculoskeletal:  Yes Muscular Weakness


Neurological:  Yes Weakness


Skin:  Yes Dry Skin





Physical Exam


General:  Alert, Oriented X3, Cooperative, No acute distress


HEENT:  Atraumatic, PERRLA


Lungs:  Clear to auscultation


Heart:  Regular rate, Normal S1, Normal S2


Abdomen:  Normal bowel sounds, Soft, No tenderness


Skin:  No breakdown


Neuro:  Normal speech, Cranial nerves 3-12 NL


Psych/Mental Status:  Mental status NL, Mood NL


MUSCULOSKELETAL:  No deformity, No swelling





Vitals


VITALS





Vital Signs








  Date Time  Temp Pulse Resp B/P (MAP) Pulse Ox O2 Delivery O2 Flow Rate FiO2


 


8/17/18 07:00 97.9 79 16 115/63 (80) 95 Room Air  





 97.9       











Labs


Labs





Laboratory Tests








Test


 8/16/18


19:15 8/16/18


20:57 8/17/18


06:00 8/17/18


06:50


 


White Blood Count


 10.5 x10^3/uL


(4.0-11.0) 


 


 8.4 x10^3/uL


(4.0-11.0)


 


Red Blood Count


 3.15 x10^6/uL


(4.30-5.70) 


 


 2.86 x10^6/uL


(4.30-5.70)


 


Hemoglobin


 9.3 g/dL


(13.0-17.5) 


 


 8.4 g/dL


(13.0-17.5)


 


Hematocrit


 27.5 %


(39.0-53.0) 


 


 24.9 %


(39.0-53.0)


 


Mean Corpuscular Volume 87 fL ()    87 fL () 


 


Mean Corpuscular Hemoglobin 30 pg (25-35)    29 pg (25-35) 


 


Mean Corpuscular Hemoglobin


Concent 34 g/dL


(31-37) 


 


 34 g/dL


(31-37)


 


Red Cell Distribution Width


 14.4 %


(11.5-14.5) 


 


 14.0 %


(11.5-14.5)


 


Platelet Count


 330 x10^3/uL


(140-400) 


 


 289 x10^3/uL


(140-400)


 


Neutrophils (%) (Auto) 73 % (31-73)    69 % (31-73) 


 


Lymphocytes (%) (Auto) 13 % (24-48)    13 % (24-48) 


 


Monocytes (%) (Auto) 8 % (0-9)    9 % (0-9) 


 


Eosinophils (%) (Auto) 5 % (0-3)    8 % (0-3) 


 


Basophils (%) (Auto) 2 % (0-3)    2 % (0-3) 


 


Neutrophils # (Auto)


 7.6 x10^3uL


(1.8-7.7) 


 


 5.8 x10^3uL


(1.8-7.7)


 


Lymphocytes # (Auto)


 1.3 x10^3/uL


(1.0-4.8) 


 


 1.1 x10^3/uL


(1.0-4.8)


 


Monocytes # (Auto)


 0.8 x10^3/uL


(0.0-1.1) 


 


 0.7 x10^3/uL


(0.0-1.1)


 


Eosinophils # (Auto)


 0.5 x10^3/uL


(0.0-0.7) 


 


 0.6 x10^3/uL


(0.0-0.7)


 


Basophils # (Auto)


 0.2 x10^3/uL


(0.0-0.2) 


 


 0.2 x10^3/uL


(0.0-0.2)


 


Sodium Level


 138 mmol/L


(136-145) 


 142 mmol/L


(136-145) 





 


Potassium Level


 4.5 mmol/L


(3.5-5.1) 


 4.2 mmol/L


(3.5-5.1) 





 


Chloride Level


 106 mmol/L


() 


 110 mmol/L


() 





 


Carbon Dioxide Level


 25 mmol/L


(21-32) 


 24 mmol/L


(21-32) 





 


Anion Gap 7 (6-14)   8 (6-14)  


 


Blood Urea Nitrogen


 63 mg/dL


(8-26) 


 56 mg/dL


(8-26) 





 


Creatinine


 4.2 mg/dL


(0.7-1.3) 


 3.8 mg/dL


(0.7-1.3) 





 


Estimated GFR


(Cockcroft-Gault) 13.7 


 


 15.4 


 





 


BUN/Creatinine Ratio 15 (6-20)   15 (6-20)  


 


Glucose Level


 115 mg/dL


(70-99) 


 111 mg/dL


(70-99) 





 


Calcium Level


 8.9 mg/dL


(8.5-10.1) 


 8.2 mg/dL


(8.5-10.1) 





 


Total Bilirubin


 0.2 mg/dL


(0.2-1.0) 


 0.2 mg/dL


(0.2-1.0) 





 


Aspartate Amino Transf


(AST/SGOT) 13 U/L (15-37) 


 


 11 U/L (15-37) 


 





 


Alanine Aminotransferase


(ALT/SGPT) 22 U/L (16-63) 


 


 22 U/L (16-63) 


 





 


Alkaline Phosphatase


 66 U/L


() 


 58 U/L


() 





 


Total Protein


 7.2 g/dL


(6.4-8.2) 


 6.0 g/dL


(6.4-8.2) 





 


Albumin


 3.1 g/dL


(3.4-5.0) 


 2.5 g/dL


(3.4-5.0) 





 


Albumin/Globulin Ratio 0.8 (1.0-1.7)   0.7 (1.0-1.7)  


 


Urine Collection Type  Unknown   


 


Urine Color  Yellow   


 


Urine Clarity  Clear   


 


Urine pH  6.0   


 


Urine Specific Gravity  1.010   


 


Urine Protein


 


 Negative mg/dL


(NEG-TRACE) 


 





 


Urine Glucose (UA)


 


 Negative mg/dL


(NEG) 


 





 


Urine Ketones (Stick)


 


 Negative mg/dL


(NEG) 


 





 


Urine Blood  Negative (NEG)   


 


Urine Nitrite  Negative (NEG)   


 


Urine Bilirubin  Negative (NEG)   


 


Urine Urobilinogen Dipstick


 


 0.2 mg/dL (0.2


mg/dL) 


 





 


Urine Leukocyte Esterase  Moderate (NEG)   


 


Urine RBC  Occ /HPF (0-2)   


 


Urine WBC


 


 20-40 /HPF


(0-4) 


 





 


Urine Bacteria  0 /HPF (0-FEW)   








Laboratory Tests








Test


 8/16/18


19:15 8/16/18


20:57 8/17/18


06:00 8/17/18


06:50


 


White Blood Count


 10.5 x10^3/uL


(4.0-11.0) 


 


 8.4 x10^3/uL


(4.0-11.0)


 


Red Blood Count


 3.15 x10^6/uL


(4.30-5.70) 


 


 2.86 x10^6/uL


(4.30-5.70)


 


Hemoglobin


 9.3 g/dL


(13.0-17.5) 


 


 8.4 g/dL


(13.0-17.5)


 


Hematocrit


 27.5 %


(39.0-53.0) 


 


 24.9 %


(39.0-53.0)


 


Mean Corpuscular Volume 87 fL ()    87 fL () 


 


Mean Corpuscular Hemoglobin 30 pg (25-35)    29 pg (25-35) 


 


Mean Corpuscular Hemoglobin


Concent 34 g/dL


(31-37) 


 


 34 g/dL


(31-37)


 


Red Cell Distribution Width


 14.4 %


(11.5-14.5) 


 


 14.0 %


(11.5-14.5)


 


Platelet Count


 330 x10^3/uL


(140-400) 


 


 289 x10^3/uL


(140-400)


 


Neutrophils (%) (Auto) 73 % (31-73)    69 % (31-73) 


 


Lymphocytes (%) (Auto) 13 % (24-48)    13 % (24-48) 


 


Monocytes (%) (Auto) 8 % (0-9)    9 % (0-9) 


 


Eosinophils (%) (Auto) 5 % (0-3)    8 % (0-3) 


 


Basophils (%) (Auto) 2 % (0-3)    2 % (0-3) 


 


Neutrophils # (Auto)


 7.6 x10^3uL


(1.8-7.7) 


 


 5.8 x10^3uL


(1.8-7.7)


 


Lymphocytes # (Auto)


 1.3 x10^3/uL


(1.0-4.8) 


 


 1.1 x10^3/uL


(1.0-4.8)


 


Monocytes # (Auto)


 0.8 x10^3/uL


(0.0-1.1) 


 


 0.7 x10^3/uL


(0.0-1.1)


 


Eosinophils # (Auto)


 0.5 x10^3/uL


(0.0-0.7) 


 


 0.6 x10^3/uL


(0.0-0.7)


 


Basophils # (Auto)


 0.2 x10^3/uL


(0.0-0.2) 


 


 0.2 x10^3/uL


(0.0-0.2)


 


Sodium Level


 138 mmol/L


(136-145) 


 142 mmol/L


(136-145) 





 


Potassium Level


 4.5 mmol/L


(3.5-5.1) 


 4.2 mmol/L


(3.5-5.1) 





 


Chloride Level


 106 mmol/L


() 


 110 mmol/L


() 





 


Carbon Dioxide Level


 25 mmol/L


(21-32) 


 24 mmol/L


(21-32) 





 


Anion Gap 7 (6-14)   8 (6-14)  


 


Blood Urea Nitrogen


 63 mg/dL


(8-26) 


 56 mg/dL


(8-26) 





 


Creatinine


 4.2 mg/dL


(0.7-1.3) 


 3.8 mg/dL


(0.7-1.3) 





 


Estimated GFR


(Cockcroft-Gault) 13.7 


 


 15.4 


 





 


BUN/Creatinine Ratio 15 (6-20)   15 (6-20)  


 


Glucose Level


 115 mg/dL


(70-99) 


 111 mg/dL


(70-99) 





 


Calcium Level


 8.9 mg/dL


(8.5-10.1) 


 8.2 mg/dL


(8.5-10.1) 





 


Total Bilirubin


 0.2 mg/dL


(0.2-1.0) 


 0.2 mg/dL


(0.2-1.0) 





 


Aspartate Amino Transf


(AST/SGOT) 13 U/L (15-37) 


 


 11 U/L (15-37) 


 





 


Alanine Aminotransferase


(ALT/SGPT) 22 U/L (16-63) 


 


 22 U/L (16-63) 


 





 


Alkaline Phosphatase


 66 U/L


() 


 58 U/L


() 





 


Total Protein


 7.2 g/dL


(6.4-8.2) 


 6.0 g/dL


(6.4-8.2) 





 


Albumin


 3.1 g/dL


(3.4-5.0) 


 2.5 g/dL


(3.4-5.0) 





 


Albumin/Globulin Ratio 0.8 (1.0-1.7)   0.7 (1.0-1.7)  


 


Urine Collection Type  Unknown   


 


Urine Color  Yellow   


 


Urine Clarity  Clear   


 


Urine pH  6.0   


 


Urine Specific Gravity  1.010   


 


Urine Protein


 


 Negative mg/dL


(NEG-TRACE) 


 





 


Urine Glucose (UA)


 


 Negative mg/dL


(NEG) 


 





 


Urine Ketones (Stick)


 


 Negative mg/dL


(NEG) 


 





 


Urine Blood  Negative (NEG)   


 


Urine Nitrite  Negative (NEG)   


 


Urine Bilirubin  Negative (NEG)   


 


Urine Urobilinogen Dipstick


 


 0.2 mg/dL (0.2


mg/dL) 


 





 


Urine Leukocyte Esterase  Moderate (NEG)   


 


Urine RBC  Occ /HPF (0-2)   


 


Urine WBC


 


 20-40 /HPF


(0-4) 


 





 


Urine Bacteria  0 /HPF (0-FEW)   











Assessment/Plan


Assessment/Plan


IMP





DEHYDRATION


HYPOTENSION


GLORIA WITH CR OF 4.2


CKD STAGE 3 WITH CR OF 1.5


HX OF HTN





PLAN





HYDRATION


RENAL SONOGRAM


HOLD ACE-I AND DIURETICS


LABS IN AM











ANUSHKA AMANDA MD Aug 17, 2018 10:42

## 2018-08-17 NOTE — HP
ADMIT DATE:  08/16/2018



CHIEF COMPLAINT:  Abnormal lab.



HISTORY OF PRESENT ILLNESS:  The patient is an 83-year-old male with a history

of chronic kidney disease stage 3.  He saw Dr. Roldan in our office on the day

prior to admission reporting fatigue and low blood pressure.  He has a history

of hypertension and has been on lisinopril/hydrochlorothiazide for a long time. 

He had stopped taking it several days previous because his blood pressure had

been so low on it.  He also reported feeling unusually tired.  Dr. Roldan ordered

some lab for further evaluation.  He received the results yesterday and they

showed the patient to be in acute renal failure with a creatinine of 4.29 and a

GFR of 12.  Dr. Roldan contacted the patient and advised him to present to the

Emergency Room for further evaluation and treatment.  The patient did this.  Lab

was repeated, which continued to show an elevated creatinine at 4.2 and he was

admitted for further care.



PAST MEDICAL HISTORY:  Chronic kidney disease stage 3; hypertension; diabetes

mellitus type 2, diet controlled; BPH; chronic anemia and perforated duodenal

ulcer, 6/18.



PAST SURGICAL HISTORY:  Laparoscopic repair of perforated duodenal ulcer, 6/18;

cataract removal and vasectomy.



ALLERGIES:  The patient has no known drug allergies.



HOME MEDICATIONS:  Finasteride 5 mg daily, Flomax 0.4 mg daily, pantoprazole 40

mg daily, simvastatin 20 mg daily and multivitamin.  The patient had been on

lisinopril/hydrochlorothiazide 20/12.5, but has not taken this for about one

week.



FAMILY HISTORY:  Noncontributory.



SOCIAL HISTORY:  The patient is  and lives at home with his wife.  He

smoked cigarettes, but quit smoking cigarettes in 1987.  He does not drink

alcohol to excess.



REVIEW OF SYSTEMS:  The patient denies fever or chills.  He denies cough or

shortness of breath.  He denies chest pain or palpitations.  He denies abdominal

pain, nausea, vomiting or problems with his bowels.  He denies dysuria.  He

reports that he has had a good urine output and has to get up several times at

night to urinate, which is usual for him.  He has not taken any

anti-inflammatory medications recently.  He denies lower extremity edema.  He

denies dysuria or unusual back pain.



PHYSICAL EXAMINATION:

GENERAL:  The patient is alert and oriented x 3, resting comfortably in bed in

no acute distress.

HEENT:  PERRL, EOMI, sclerae clear.  Oropharynx:  Mucous membranes moist.

NECK:  Supple, without lymphadenopathy.

CHEST:  Clear to auscultation.

CARDIOVASCULAR:  Regular rhythm without murmur.

ABDOMEN:  Soft, nontender, normoactive bowel sounds are present.

EXTREMITIES:  Without edema.



ASSESSMENT AND PLAN:

1.  Acute renal failure with history of chronic kidney disease stage 3.  The

patient's renal function has gradually worsened over the past 2 weeks on lab in

our office.  His creatinine was 2.27 on 08/03/2018 and then increased to 4.29 on

08/15/2018 as in the HPI.  The patient has not had a recent illness or

dehydration.  He has not had any new medications and denies taking any NSAIDs. 

He has no pain to suggest renal stones or infection.  He reports that he has

continued to drink plenty of water daily and has a good urine output. 

Therefore, the cause of his renal failure is unclear at this time.  A renal

ultrasound has been ordered.  He has been on IV fluid overnight and has had an

improvement in his creatinine to 3.8.  A consult with Nephrology is pending.

2.  History of hypertension.  The patient stopped his medication about a week

ago.  He is presently normotensive without medication.  We will continue to

follow this.

3.  Diabetes mellitus type 2.  This has been diet controlled for him for some

time.  His last A1c was 6.1.  An ADA diet is ordered.  Fingersticks and sliding

scale are not indicated at this time.

4.  Chronic anemia.  This is thought to be due at least in part to his chronic

kidney disease.  Recent lab at our office did show decreased serum iron, but a

normal ferritin and normal B12.  We will continue to follow this.

5.  History of perforated duodenal ulcer.  The patient has been stable since his

surgery in June.  We will continue Protonix once daily and avoid aspirin.

6.  Benign prostatic hypertrophy.  The patient does report chronic nocturia.  We

will continue his home medications and check a postvoid residual.

 



______________________________

CARRIE MAR MD



DR:  JULIANA/danni  JOB#:  [id number] / [id number]

DD:  08/17/2018 08:34  DT:  08/17/2018 09:22

BRAYAN

## 2018-08-17 NOTE — PDOC
PROGRESS NOTES


Subjective


Subjective


Patient without complaint, denies pain. Reports good urine output.





Objective


Objective





Vital Signs








  Date Time  Temp Pulse Resp B/P (MAP) Pulse Ox O2 Delivery O2 Flow Rate FiO2


 


8/17/18 03:00 98.9 86 18 133/82 (99) 98 Room Air  





 98.9       














Intake and Output 


 


 8/17/18





 07:00


 


Intake Total 120 ml


 


Output Total 425 ml


 


Balance -305 ml


 


 


 


Intake Oral 120 ml


 


Output Urine Total 425 ml











Physical Exam


Abdomen:  Normal bowel sounds, Soft, No tenderness


Heart:  Regular rate


Extremities:  No edema


General:  Alert, Oriented X3, No acute distress


Lungs:  Clear to auscultation





Assessment


Assessment


Problems


Medical Problems:


(1) GLORIA (acute kidney injury)


Status: Acute  











Plan


Plan of Care


1. Acute renal failure with history of CKD III - patient's renal function has 

gradually worsened over the past two weeks on lab at our office. Creatinine was 

2.27 on 8/3/18 then increased to 4.29 on 8/15/18. No recent illness or 

dehydration. No new meds, no NSAID's. No pain to suggest renal stones or 

infection. Patient reports good urine output. Renal u/s ordered, continue IVF, 

Nephrology consult pending. 


2. Hx HTN - patient had been on Lisinopril/HCTZ but stopped these about a week 

ago when he noticed his BP getting low at home. Presently normotensive without 

meds.


3. DM2 - this has been diet-controlled for him for awhile, last A1C was 6.1  

ADA diet ordered.


4. Chronic anemia - thought to be due, at least in part, to his CKD. Recent lab 

at our office did show decreased iron but normal ferritin. B12 was WNL. 


5. Hx perforated duodenal ulcer - stable, continue Protonix once daily. Avoid 

ASA.


6. BPH - patient does report nocturia, which is chronic for him. Continue home 

meds. Check PVR.





Comment


Review of Relevant


I have reviewed the following items ca (where applicable) has been applied.


Labs





Laboratory Tests








Test


 8/16/18


19:15 8/16/18


20:57 8/17/18


06:00 8/17/18


06:50


 


White Blood Count


 10.5 x10^3/uL


(4.0-11.0) 


 


 8.4 x10^3/uL


(4.0-11.0)


 


Red Blood Count


 3.15 x10^6/uL


(4.30-5.70) 


 


 2.86 x10^6/uL


(4.30-5.70)


 


Hemoglobin


 9.3 g/dL


(13.0-17.5) 


 


 8.4 g/dL


(13.0-17.5)


 


Hematocrit


 27.5 %


(39.0-53.0) 


 


 24.9 %


(39.0-53.0)


 


Mean Corpuscular Volume 87 fL ()    87 fL () 


 


Mean Corpuscular Hemoglobin 30 pg (25-35)    29 pg (25-35) 


 


Mean Corpuscular Hemoglobin


Concent 34 g/dL


(31-37) 


 


 34 g/dL


(31-37)


 


Red Cell Distribution Width


 14.4 %


(11.5-14.5) 


 


 14.0 %


(11.5-14.5)


 


Platelet Count


 330 x10^3/uL


(140-400) 


 


 289 x10^3/uL


(140-400)


 


Neutrophils (%) (Auto) 73 % (31-73)    69 % (31-73) 


 


Lymphocytes (%) (Auto) 13 % (24-48)    13 % (24-48) 


 


Monocytes (%) (Auto) 8 % (0-9)    9 % (0-9) 


 


Eosinophils (%) (Auto) 5 % (0-3)    8 % (0-3) 


 


Basophils (%) (Auto) 2 % (0-3)    2 % (0-3) 


 


Neutrophils # (Auto)


 7.6 x10^3uL


(1.8-7.7) 


 


 5.8 x10^3uL


(1.8-7.7)


 


Lymphocytes # (Auto)


 1.3 x10^3/uL


(1.0-4.8) 


 


 1.1 x10^3/uL


(1.0-4.8)


 


Monocytes # (Auto)


 0.8 x10^3/uL


(0.0-1.1) 


 


 0.7 x10^3/uL


(0.0-1.1)


 


Eosinophils # (Auto)


 0.5 x10^3/uL


(0.0-0.7) 


 


 0.6 x10^3/uL


(0.0-0.7)


 


Basophils # (Auto)


 0.2 x10^3/uL


(0.0-0.2) 


 


 0.2 x10^3/uL


(0.0-0.2)


 


Sodium Level


 138 mmol/L


(136-145) 


 142 mmol/L


(136-145) 





 


Potassium Level


 4.5 mmol/L


(3.5-5.1) 


 4.2 mmol/L


(3.5-5.1) 





 


Chloride Level


 106 mmol/L


() 


 110 mmol/L


() 





 


Carbon Dioxide Level


 25 mmol/L


(21-32) 


 24 mmol/L


(21-32) 





 


Anion Gap 7 (6-14)   8 (6-14)  


 


Blood Urea Nitrogen


 63 mg/dL


(8-26) 


 56 mg/dL


(8-26) 





 


Creatinine


 4.2 mg/dL


(0.7-1.3) 


 3.8 mg/dL


(0.7-1.3) 





 


Estimated GFR


(Cockcroft-Gault) 13.7 


 


 15.4 


 





 


BUN/Creatinine Ratio 15 (6-20)   15 (6-20)  


 


Glucose Level


 115 mg/dL


(70-99) 


 111 mg/dL


(70-99) 





 


Calcium Level


 8.9 mg/dL


(8.5-10.1) 


 8.2 mg/dL


(8.5-10.1) 





 


Total Bilirubin


 0.2 mg/dL


(0.2-1.0) 


 0.2 mg/dL


(0.2-1.0) 





 


Aspartate Amino Transf


(AST/SGOT) 13 U/L (15-37) 


 


 11 U/L (15-37) 


 





 


Alanine Aminotransferase


(ALT/SGPT) 22 U/L (16-63) 


 


 22 U/L (16-63) 


 





 


Alkaline Phosphatase


 66 U/L


() 


 58 U/L


() 





 


Total Protein


 7.2 g/dL


(6.4-8.2) 


 6.0 g/dL


(6.4-8.2) 





 


Albumin


 3.1 g/dL


(3.4-5.0) 


 2.5 g/dL


(3.4-5.0) 





 


Albumin/Globulin Ratio 0.8 (1.0-1.7)   0.7 (1.0-1.7)  


 


Urine Collection Type  Unknown   


 


Urine Color  Yellow   


 


Urine Clarity  Clear   


 


Urine pH  6.0   


 


Urine Specific Gravity  1.010   


 


Urine Protein


 


 Negative mg/dL


(NEG-TRACE) 


 





 


Urine Glucose (UA)


 


 Negative mg/dL


(NEG) 


 





 


Urine Ketones (Stick)


 


 Negative mg/dL


(NEG) 


 





 


Urine Blood  Negative (NEG)   


 


Urine Nitrite  Negative (NEG)   


 


Urine Bilirubin  Negative (NEG)   


 


Urine Urobilinogen Dipstick


 


 0.2 mg/dL (0.2


mg/dL) 


 





 


Urine Leukocyte Esterase  Moderate (NEG)   


 


Urine RBC  Occ /HPF (0-2)   


 


Urine WBC


 


 20-40 /HPF


(0-4) 


 





 


Urine Bacteria  0 /HPF (0-FEW)   








Laboratory Tests








Test


 8/16/18


19:15 8/16/18


20:57 8/17/18


06:00 8/17/18


06:50


 


White Blood Count


 10.5 x10^3/uL


(4.0-11.0) 


 


 8.4 x10^3/uL


(4.0-11.0)


 


Red Blood Count


 3.15 x10^6/uL


(4.30-5.70) 


 


 2.86 x10^6/uL


(4.30-5.70)


 


Hemoglobin


 9.3 g/dL


(13.0-17.5) 


 


 8.4 g/dL


(13.0-17.5)


 


Hematocrit


 27.5 %


(39.0-53.0) 


 


 24.9 %


(39.0-53.0)


 


Mean Corpuscular Volume 87 fL ()    87 fL () 


 


Mean Corpuscular Hemoglobin 30 pg (25-35)    29 pg (25-35) 


 


Mean Corpuscular Hemoglobin


Concent 34 g/dL


(31-37) 


 


 34 g/dL


(31-37)


 


Red Cell Distribution Width


 14.4 %


(11.5-14.5) 


 


 14.0 %


(11.5-14.5)


 


Platelet Count


 330 x10^3/uL


(140-400) 


 


 289 x10^3/uL


(140-400)


 


Neutrophils (%) (Auto) 73 % (31-73)    69 % (31-73) 


 


Lymphocytes (%) (Auto) 13 % (24-48)    13 % (24-48) 


 


Monocytes (%) (Auto) 8 % (0-9)    9 % (0-9) 


 


Eosinophils (%) (Auto) 5 % (0-3)    8 % (0-3) 


 


Basophils (%) (Auto) 2 % (0-3)    2 % (0-3) 


 


Neutrophils # (Auto)


 7.6 x10^3uL


(1.8-7.7) 


 


 5.8 x10^3uL


(1.8-7.7)


 


Lymphocytes # (Auto)


 1.3 x10^3/uL


(1.0-4.8) 


 


 1.1 x10^3/uL


(1.0-4.8)


 


Monocytes # (Auto)


 0.8 x10^3/uL


(0.0-1.1) 


 


 0.7 x10^3/uL


(0.0-1.1)


 


Eosinophils # (Auto)


 0.5 x10^3/uL


(0.0-0.7) 


 


 0.6 x10^3/uL


(0.0-0.7)


 


Basophils # (Auto)


 0.2 x10^3/uL


(0.0-0.2) 


 


 0.2 x10^3/uL


(0.0-0.2)


 


Sodium Level


 138 mmol/L


(136-145) 


 142 mmol/L


(136-145) 





 


Potassium Level


 4.5 mmol/L


(3.5-5.1) 


 4.2 mmol/L


(3.5-5.1) 





 


Chloride Level


 106 mmol/L


() 


 110 mmol/L


() 





 


Carbon Dioxide Level


 25 mmol/L


(21-32) 


 24 mmol/L


(21-32) 





 


Anion Gap 7 (6-14)   8 (6-14)  


 


Blood Urea Nitrogen


 63 mg/dL


(8-26) 


 56 mg/dL


(8-26) 





 


Creatinine


 4.2 mg/dL


(0.7-1.3) 


 3.8 mg/dL


(0.7-1.3) 





 


Estimated GFR


(Cockcroft-Gault) 13.7 


 


 15.4 


 





 


BUN/Creatinine Ratio 15 (6-20)   15 (6-20)  


 


Glucose Level


 115 mg/dL


(70-99) 


 111 mg/dL


(70-99) 





 


Calcium Level


 8.9 mg/dL


(8.5-10.1) 


 8.2 mg/dL


(8.5-10.1) 





 


Total Bilirubin


 0.2 mg/dL


(0.2-1.0) 


 0.2 mg/dL


(0.2-1.0) 





 


Aspartate Amino Transf


(AST/SGOT) 13 U/L (15-37) 


 


 11 U/L (15-37) 


 





 


Alanine Aminotransferase


(ALT/SGPT) 22 U/L (16-63) 


 


 22 U/L (16-63) 


 





 


Alkaline Phosphatase


 66 U/L


() 


 58 U/L


() 





 


Total Protein


 7.2 g/dL


(6.4-8.2) 


 6.0 g/dL


(6.4-8.2) 





 


Albumin


 3.1 g/dL


(3.4-5.0) 


 2.5 g/dL


(3.4-5.0) 





 


Albumin/Globulin Ratio 0.8 (1.0-1.7)   0.7 (1.0-1.7)  


 


Urine Collection Type  Unknown   


 


Urine Color  Yellow   


 


Urine Clarity  Clear   


 


Urine pH  6.0   


 


Urine Specific Gravity  1.010   


 


Urine Protein


 


 Negative mg/dL


(NEG-TRACE) 


 





 


Urine Glucose (UA)


 


 Negative mg/dL


(NEG) 


 





 


Urine Ketones (Stick)


 


 Negative mg/dL


(NEG) 


 





 


Urine Blood  Negative (NEG)   


 


Urine Nitrite  Negative (NEG)   


 


Urine Bilirubin  Negative (NEG)   


 


Urine Urobilinogen Dipstick


 


 0.2 mg/dL (0.2


mg/dL) 


 





 


Urine Leukocyte Esterase  Moderate (NEG)   


 


Urine RBC  Occ /HPF (0-2)   


 


Urine WBC


 


 20-40 /HPF


(0-4) 


 





 


Urine Bacteria  0 /HPF (0-FEW)   








Medications





Current Medications


Sodium Chloride 1,000 ml @  1,000 mls/hr 1X  ONCE IV  Last administered on 8/16/ 18at 21:09;  Start 8/16/18 at 21:15;  Stop 8/16/18 at 22:14;  Status DC


Ondansetron HCl (Zofran) 4 mg PRN Q8HRS  PRN IV NAUSEA/VOMITING;  Start 8/16/18 

at 21:15;  Stop 8/17/18 at 21:14


Fentanyl Citrate (Fentanyl 2ml Vial) 50 mcg PRN Q1HR  PRN IV PAIN;  Start 8/16/ 18 at 21:15;  Stop 8/17/18 at 21:14


Acetaminophen (Tylenol) 650 mg PRN Q4HRS  PRN PO FEVER;  Start 8/16/18 at 21:15

;  Stop 8/17/18 at 21:14


Finasteride (Proscar) 5 mg HS PO  Last administered on 8/16/18at 23:23;  Start 8 /16/18 at 23:30


Ondansetron HCl (Zofran Odt) 4 mg PRN Q4HRS  PRN PO VOMITING 1ST CHOICE;  Start 

8/16/18 at 23:15


Tamsulosin HCl (Flomax) 0.4 mg HS PO  Last administered on 8/16/18at 23:23;  

Start 8/16/18 at 23:30


Multivitamins (Thera M Plus) 1 tab DAILY PO ;  Start 8/17/18 at 09:00


Fish Oil (Fish Oil) 1,000 mg DAILY PO ;  Start 8/17/18 at 09:00


Simvastatin (Zocor) 20 mg HS PO ;  Start 8/17/18 at 21:00


Pantoprazole Sodium (Protonix) 40 mg BIDAC PO ;  Start 8/17/18 at 07:30;  Stop 8 /17/18 at 07:30;  Status DC


Sodium Chloride 1,000 ml @  100 mls/hr Q10H IV  Last administered on 8/16/18at 

23:24;  Start 8/16/18 at 23:30


Pantoprazole Sodium (Protonix) 40 mg DAILYBFRSUP PO ;  Start 8/17/18 at 17:00





Active Scripts


Active


[Pantoprazole] 40 MG Tablet. 40 Mg PO BIDAC


Zofran Odt (Ondansetron) 4 Mg Tab.rapdis 1 Tab SL Q4HRS PRN


Reported


Multi-Vitamin Daily (Multivitamin) 1 Each Tablet 1 Each PO DAILY


Finasteride 5 Mg Tablet 1 Tab PO HS


Flomax (Tamsulosin Hcl) 0.4 Mg Cap.er.24h 1 Cap PO HS


Fish Oil Omega-3 EC 1,200 mg (Omega-3/Dha/Epa/Fish Oil) 1 Each Capsule. 1 

Each PO DAILY


Simvastatin 20 Mg Tablet 1 Tab PO DAILY


Vitals/I & O





Vital Sign - Last 24 Hours








 8/16/18 8/16/18 8/16/18 8/16/18





 19:08 19:30 20:00 20:30


 


Temp 98.2   





 98.2   


 


Pulse 86 88 78 81


 


Resp 20   


 


B/P (MAP) 129/69 (89) 118/62 (80) 109/63 (78) 116/67 (83)


 


Pulse Ox 97 97 97 98


 


O2 Delivery Room Air Room Air Room Air Room Air


 


    





    





 8/16/18 8/16/18 8/16/18 8/17/18





 21:00 21:30 23:00 03:00


 


Temp   98.0 98.9





   98.0 98.9


 


Pulse 74 80 81 86


 


Resp 21 24 18 18


 


B/P (MAP) 126/64 (84) 126/61 (82) 127/70 (89) 133/82 (99)


 


Pulse Ox 98 98 98 98


 


O2 Delivery Room Air Room Air Room Air Room Air














Intake and Output   


 


 8/16/18 8/16/18 8/17/18





 15:00 23:00 07:00


 


Intake Total  120 ml 


 


Output Total   425 ml


 


Balance  120 ml -425 ml

















CARRIE MAR MD Aug 17, 2018 08:27

## 2018-08-18 VITALS — DIASTOLIC BLOOD PRESSURE: 58 MMHG | SYSTOLIC BLOOD PRESSURE: 118 MMHG

## 2018-08-18 VITALS — SYSTOLIC BLOOD PRESSURE: 122 MMHG | DIASTOLIC BLOOD PRESSURE: 74 MMHG

## 2018-08-18 VITALS — DIASTOLIC BLOOD PRESSURE: 73 MMHG | SYSTOLIC BLOOD PRESSURE: 122 MMHG

## 2018-08-18 VITALS — SYSTOLIC BLOOD PRESSURE: 118 MMHG | DIASTOLIC BLOOD PRESSURE: 73 MMHG

## 2018-08-18 VITALS — DIASTOLIC BLOOD PRESSURE: 72 MMHG | SYSTOLIC BLOOD PRESSURE: 127 MMHG

## 2018-08-18 VITALS — DIASTOLIC BLOOD PRESSURE: 74 MMHG | SYSTOLIC BLOOD PRESSURE: 131 MMHG

## 2018-08-18 LAB
ANION GAP SERPL CALC-SCNC: 7 MMOL/L (ref 6–14)
BUN SERPL-MCNC: 46 MG/DL (ref 8–26)
CALCIUM SERPL-MCNC: 8.2 MG/DL (ref 8.5–10.1)
CHLORIDE SERPL-SCNC: 113 MMOL/L (ref 98–107)
CO2 SERPL-SCNC: 24 MMOL/L (ref 21–32)
CREAT SERPL-MCNC: 3.3 MG/DL (ref 0.7–1.3)
GFR SERPLBLD BASED ON 1.73 SQ M-ARVRAT: 18.1 ML/MIN
GLUCOSE SERPL-MCNC: 102 MG/DL (ref 70–99)
POTASSIUM SERPL-SCNC: 4.2 MMOL/L (ref 3.5–5.1)
SODIUM SERPL-SCNC: 144 MMOL/L (ref 136–145)

## 2018-08-18 RX ADMIN — TAMSULOSIN HYDROCHLORIDE SCH MG: 0.4 CAPSULE ORAL at 22:00

## 2018-08-18 RX ADMIN — SIMVASTATIN SCH MG: 20 TABLET, FILM COATED ORAL at 21:57

## 2018-08-18 RX ADMIN — BACITRACIN SCH MLS/HR: 5000 INJECTION, POWDER, FOR SOLUTION INTRAMUSCULAR at 05:31

## 2018-08-18 RX ADMIN — BACITRACIN SCH MLS/HR: 5000 INJECTION, POWDER, FOR SOLUTION INTRAMUSCULAR at 21:57

## 2018-08-18 RX ADMIN — MULTIPLE VITAMINS W/ MINERALS TAB SCH TAB: TAB at 09:10

## 2018-08-18 RX ADMIN — FINASTERIDE SCH MG: 5 TABLET, FILM COATED ORAL at 21:57

## 2018-08-18 RX ADMIN — Medication SCH MG: at 09:09

## 2018-08-18 RX ADMIN — PANTOPRAZOLE SODIUM SCH MG: 40 TABLET, DELAYED RELEASE ORAL at 16:07

## 2018-08-18 RX ADMIN — BACITRACIN SCH MLS/HR: 5000 INJECTION, POWDER, FOR SOLUTION INTRAMUSCULAR at 16:07

## 2018-08-18 NOTE — PDOC
PROGRESS NOTES


Subjective


He feels good, drinking plenty of water and making good amounts of urine, no 

chest pain, no SOA, no GI symptoms


Objective


Afebrile


General:  NAD


Heart:  RRR


Lungs:  CTA


Abd:  soft, non tender


Ext:  no edema


WBC:  8.4


Hgb:  8.4


K+: 4.2


Creat:  3.3


Vital Signs





Vital Signs








  Date Time  Temp Pulse Resp B/P (MAP) Pulse Ox O2 Delivery O2 Flow Rate FiO2


 


8/18/18 11:00 98.0 61 18 127/72 (90) 97 Room Air  





 98.0       








I & O











Intake and Output 


 


 8/18/18





 07:00


 


Intake Total 5023 ml


 


Output Total 3350 ml


 


Balance 1673 ml


 


 


 


Intake Oral 1960 ml


 


IV Total 3063 ml


 


Output Urine Total 3350 ml


 


# Voids 2








Assessment and Plan


A/P


(1) GLORIA (acute kidney injury) - continue IV and oral hydration


(2) HTN -stable off meds


(3) DM 2 - controlled, continue monitoring


(4) anemia of chronic renal disease - stable despite hydration


(5) BPH


 














ALEKSANDAR MIKE MD Aug 18, 2018 16:04

## 2018-08-18 NOTE — PDOC
PROGRESS NOTES


Subjective


Subjective


SEEN IN FOLLOW UP OF ARF ON CKD3





Objective


Objective





Vital Signs








  Date Time  Temp Pulse Resp B/P (MAP) Pulse Ox O2 Delivery O2 Flow Rate FiO2


 


8/18/18 11:00 98.0 61 18 127/72 (90) 97 Room Air  





 98.0       














Intake and Output 


 


 8/18/18





 07:00


 


Intake Total 5023 ml


 


Output Total 3350 ml


 


Balance 1673 ml


 


 


 


Intake Oral 1960 ml


 


IV Total 3063 ml


 


Output Urine Total 3350 ml


 


# Voids 2











Physical Exam


Abdomen:  Normal bowel sounds, Soft, No tenderness, No hepatosplenomegaly, No 

masses


Heart:  Regular rate, Normal S1, Normal S2, No murmurs, Gallops


Extremities:  No clubbing, No cyanosis, No edema, Normal pulses, No tenderness/

swelling


General:  Alert, Oriented X3, Cooperative, No acute distress


Lungs:  Clear to auscultation, Normal air movement


Psych/Mental Status:  Mental status NL, Mood NL





Diagnosis


RENAL FAILURE:  Acute (DEHYDRATION), Chronic (CKD stage III)





Assessment


Assessment


Problems


Medical Problems:


(1) GLORIA (acute kidney injury)


Status: Acute  











Plan


Plan of Care


HE IS IMPROVING PER GFR AND UO. CONT IVF AND FOLLOW





Comment


Review of Relevant


I have reviewed the following items ca (where applicable) has been applied.


Labs





Laboratory Tests








Test


 8/16/18


19:15 8/16/18


20:57 8/17/18


06:00 8/17/18


06:50


 


White Blood Count


 10.5 x10^3/uL


(4.0-11.0) 


 


 8.4 x10^3/uL


(4.0-11.0)


 


Red Blood Count


 3.15 x10^6/uL


(4.30-5.70) 


 


 2.86 x10^6/uL


(4.30-5.70)


 


Hemoglobin


 9.3 g/dL


(13.0-17.5) 


 


 8.4 g/dL


(13.0-17.5)


 


Hematocrit


 27.5 %


(39.0-53.0) 


 


 24.9 %


(39.0-53.0)


 


Mean Corpuscular Volume 87 fL ()    87 fL () 


 


Mean Corpuscular Hemoglobin 30 pg (25-35)    29 pg (25-35) 


 


Mean Corpuscular Hemoglobin


Concent 34 g/dL


(31-37) 


 


 34 g/dL


(31-37)


 


Red Cell Distribution Width


 14.4 %


(11.5-14.5) 


 


 14.0 %


(11.5-14.5)


 


Platelet Count


 330 x10^3/uL


(140-400) 


 


 289 x10^3/uL


(140-400)


 


Neutrophils (%) (Auto) 73 % (31-73)    69 % (31-73) 


 


Lymphocytes (%) (Auto) 13 % (24-48)    13 % (24-48) 


 


Monocytes (%) (Auto) 8 % (0-9)    9 % (0-9) 


 


Eosinophils (%) (Auto) 5 % (0-3)    8 % (0-3) 


 


Basophils (%) (Auto) 2 % (0-3)    2 % (0-3) 


 


Neutrophils # (Auto)


 7.6 x10^3uL


(1.8-7.7) 


 


 5.8 x10^3uL


(1.8-7.7)


 


Lymphocytes # (Auto)


 1.3 x10^3/uL


(1.0-4.8) 


 


 1.1 x10^3/uL


(1.0-4.8)


 


Monocytes # (Auto)


 0.8 x10^3/uL


(0.0-1.1) 


 


 0.7 x10^3/uL


(0.0-1.1)


 


Eosinophils # (Auto)


 0.5 x10^3/uL


(0.0-0.7) 


 


 0.6 x10^3/uL


(0.0-0.7)


 


Basophils # (Auto)


 0.2 x10^3/uL


(0.0-0.2) 


 


 0.2 x10^3/uL


(0.0-0.2)


 


Sodium Level


 138 mmol/L


(136-145) 


 142 mmol/L


(136-145) 





 


Potassium Level


 4.5 mmol/L


(3.5-5.1) 


 4.2 mmol/L


(3.5-5.1) 





 


Chloride Level


 106 mmol/L


() 


 110 mmol/L


() 





 


Carbon Dioxide Level


 25 mmol/L


(21-32) 


 24 mmol/L


(21-32) 





 


Anion Gap 7 (6-14)   8 (6-14)  


 


Blood Urea Nitrogen


 63 mg/dL


(8-26) 


 56 mg/dL


(8-26) 





 


Creatinine


 4.2 mg/dL


(0.7-1.3) 


 3.8 mg/dL


(0.7-1.3) 





 


Estimated GFR


(Cockcroft-Gault) 13.7 


 


 15.4 


 





 


BUN/Creatinine Ratio 15 (6-20)   15 (6-20)  


 


Glucose Level


 115 mg/dL


(70-99) 


 111 mg/dL


(70-99) 





 


Calcium Level


 8.9 mg/dL


(8.5-10.1) 


 8.2 mg/dL


(8.5-10.1) 





 


Total Bilirubin


 0.2 mg/dL


(0.2-1.0) 


 0.2 mg/dL


(0.2-1.0) 





 


Aspartate Amino Transf


(AST/SGOT) 13 U/L (15-37) 


 


 11 U/L (15-37) 


 





 


Alanine Aminotransferase


(ALT/SGPT) 22 U/L (16-63) 


 


 22 U/L (16-63) 


 





 


Alkaline Phosphatase


 66 U/L


() 


 58 U/L


() 





 


Total Protein


 7.2 g/dL


(6.4-8.2) 


 6.0 g/dL


(6.4-8.2) 





 


Albumin


 3.1 g/dL


(3.4-5.0) 


 2.5 g/dL


(3.4-5.0) 





 


Albumin/Globulin Ratio 0.8 (1.0-1.7)   0.7 (1.0-1.7)  


 


Urine Collection Type  Unknown   


 


Urine Color  Yellow   


 


Urine Clarity  Clear   


 


Urine pH  6.0   


 


Urine Specific Gravity  1.010   


 


Urine Protein


 


 Negative mg/dL


(NEG-TRACE) 


 





 


Urine Glucose (UA)


 


 Negative mg/dL


(NEG) 


 





 


Urine Ketones (Stick)


 


 Negative mg/dL


(NEG) 


 





 


Urine Blood  Negative (NEG)   


 


Urine Nitrite  Negative (NEG)   


 


Urine Bilirubin  Negative (NEG)   


 


Urine Urobilinogen Dipstick


 


 0.2 mg/dL (0.2


mg/dL) 


 





 


Urine Leukocyte Esterase  Moderate (NEG)   


 


Urine RBC  Occ /HPF (0-2)   


 


Urine WBC


 


 20-40 /HPF


(0-4) 


 





 


Urine Bacteria  0 /HPF (0-FEW)   


 


Test


 8/18/18


03:25 


 


 





 


Sodium Level


 144 mmol/L


(136-145) 


 


 





 


Potassium Level


 4.2 mmol/L


(3.5-5.1) 


 


 





 


Chloride Level


 113 mmol/L


() 


 


 





 


Carbon Dioxide Level


 24 mmol/L


(21-32) 


 


 





 


Anion Gap 7 (6-14)    


 


Blood Urea Nitrogen


 46 mg/dL


(8-26) 


 


 





 


Creatinine


 3.3 mg/dL


(0.7-1.3) 


 


 





 


Estimated GFR


(Cockcroft-Gault) 18.1 


 


 


 





 


Glucose Level


 102 mg/dL


(70-99) 


 


 





 


Calcium Level


 8.2 mg/dL


(8.5-10.1) 


 


 











Laboratory Tests








Test


 8/18/18


03:25


 


Sodium Level


 144 mmol/L


(136-145)


 


Potassium Level


 4.2 mmol/L


(3.5-5.1)


 


Chloride Level


 113 mmol/L


()


 


Carbon Dioxide Level


 24 mmol/L


(21-32)


 


Anion Gap 7 (6-14) 


 


Blood Urea Nitrogen


 46 mg/dL


(8-26)


 


Creatinine


 3.3 mg/dL


(0.7-1.3)


 


Estimated GFR


(Cockcroft-Gault) 18.1 





 


Glucose Level


 102 mg/dL


(70-99)


 


Calcium Level


 8.2 mg/dL


(8.5-10.1)








Medications





Current Medications


Sodium Chloride 1,000 ml @  1,000 mls/hr 1X  ONCE IV  Last administered on 8/16/ 18at 21:09;  Start 8/16/18 at 21:15;  Stop 8/16/18 at 22:14;  Status DC


Ondansetron HCl (Zofran) 4 mg PRN Q8HRS  PRN IV NAUSEA/VOMITING;  Start 8/16/18 

at 21:15;  Stop 8/17/18 at 21:14;  Status DC


Fentanyl Citrate (Fentanyl 2ml Vial) 50 mcg PRN Q1HR  PRN IV PAIN;  Start 8/16/ 18 at 21:15;  Stop 8/17/18 at 08:16;  Status DC


Acetaminophen (Tylenol) 650 mg PRN Q4HRS  PRN PO FEVER;  Start 8/16/18 at 21:15

;  Stop 8/17/18 at 21:14;  Status DC


Finasteride (Proscar) 5 mg HS PO  Last administered on 8/17/18at 21:03;  Start 8 /16/18 at 23:30


Ondansetron HCl (Zofran Odt) 4 mg PRN Q4HRS  PRN PO VOMITING 1ST CHOICE;  Start 

8/16/18 at 23:15


Tamsulosin HCl (Flomax) 0.4 mg HS PO  Last administered on 8/17/18at 21:03;  

Start 8/16/18 at 23:30


Multivitamins (Thera M Plus) 1 tab DAILY PO  Last administered on 8/18/18at 09:

10;  Start 8/17/18 at 09:00


Fish Oil (Fish Oil) 1,000 mg DAILY PO  Last administered on 8/18/18at 09:09;  

Start 8/17/18 at 09:00


Simvastatin (Zocor) 20 mg HS PO  Last administered on 8/17/18at 21:03;  Start 8/ 17/18 at 21:00


Pantoprazole Sodium (Protonix) 40 mg BIDAC PO ;  Start 8/17/18 at 07:30;  Stop 8 /17/18 at 07:30;  Status DC


Sodium Chloride 1,000 ml @  100 mls/hr Q10H IV  Last administered on 8/18/18at 

05:31;  Start 8/16/18 at 23:30


Pantoprazole Sodium (Protonix) 40 mg DAILYBFRSUP PO  Last administered on 8/17/ 18at 17:54;  Start 8/17/18 at 17:00





Active Scripts


Active


[Pantoprazole] 40 MG Tablet. 40 Mg PO BIDAC


Zofran Odt (Ondansetron) 4 Mg Tab.rapdis 1 Tab SL Q4HRS PRN


Reported


Multi-Vitamin Daily (Multivitamin) 1 Each Tablet 1 Each PO DAILY


Finasteride 5 Mg Tablet 1 Tab PO HS


Flomax (Tamsulosin Hcl) 0.4 Mg Cap.er.24h 1 Cap PO HS


Fish Oil Omega-3 EC 1,200 mg (Omega-3/Dha/Epa/Fish Oil) 1 Each Capsule. 1 

Each PO DAILY


Simvastatin 20 Mg Tablet 1 Tab PO DAILY


Vitals/I & O





Vital Sign - Last 24 Hours








 8/17/18 8/17/18 8/17/18 8/17/18





 15:00 19:15 19:45 22:52


 


Temp 97.5 98.5  98.4





 97.5 98.5  98.4


 


Pulse 67 82  77


 


Resp 16 18  18


 


B/P (MAP) 114/69 (84) 120/68 (85)  123/70 (87)


 


Pulse Ox 95 96  96


 


O2 Delivery Room Air Room Air Room Air Room Air


 


    





    





 8/18/18 8/18/18 8/18/18 8/18/18





 03:11 07:00 07:45 11:00


 


Temp 98.3 98.1  98.0





 98.3 98.1  98.0


 


Pulse 76 73  61


 


Resp 18 18  18


 


B/P (MAP) 122/74 (90) 118/73 (88)  127/72 (90)


 


Pulse Ox 97   97


 


O2 Delivery Room Air Room Air Room Air Room Air














Intake and Output   


 


 8/17/18 8/17/18 8/18/18





 15:00 23:00 07:00


 


Intake Total 240 ml 1160 ml 3623 ml


 


Output Total  2025 ml 1325 ml


 


Balance 240 ml -865 ml 2298 ml

















FREDRICK GREER MD Aug 18, 2018 12:31

## 2018-08-19 VITALS — DIASTOLIC BLOOD PRESSURE: 77 MMHG | SYSTOLIC BLOOD PRESSURE: 135 MMHG

## 2018-08-19 VITALS — DIASTOLIC BLOOD PRESSURE: 77 MMHG | SYSTOLIC BLOOD PRESSURE: 140 MMHG

## 2018-08-19 VITALS — DIASTOLIC BLOOD PRESSURE: 76 MMHG | SYSTOLIC BLOOD PRESSURE: 139 MMHG

## 2018-08-19 VITALS — SYSTOLIC BLOOD PRESSURE: 124 MMHG | DIASTOLIC BLOOD PRESSURE: 69 MMHG

## 2018-08-19 VITALS — SYSTOLIC BLOOD PRESSURE: 141 MMHG | DIASTOLIC BLOOD PRESSURE: 76 MMHG

## 2018-08-19 VITALS — SYSTOLIC BLOOD PRESSURE: 118 MMHG | DIASTOLIC BLOOD PRESSURE: 69 MMHG

## 2018-08-19 LAB
ANION GAP SERPL CALC-SCNC: 10 MMOL/L (ref 6–14)
BASOPHILS # BLD AUTO: 0.2 X10^3/UL (ref 0–0.2)
BASOPHILS NFR BLD: 2 % (ref 0–3)
BUN SERPL-MCNC: 35 MG/DL (ref 8–26)
CALCIUM SERPL-MCNC: 8.1 MG/DL (ref 8.5–10.1)
CHLORIDE SERPL-SCNC: 113 MMOL/L (ref 98–107)
CO2 SERPL-SCNC: 21 MMOL/L (ref 21–32)
CREAT SERPL-MCNC: 3 MG/DL (ref 0.7–1.3)
EOSINOPHIL NFR BLD: 0.7 X10^3/UL (ref 0–0.7)
EOSINOPHIL NFR BLD: 9 % (ref 0–3)
ERYTHROCYTE [DISTWIDTH] IN BLOOD BY AUTOMATED COUNT: 13.8 % (ref 11.5–14.5)
GFR SERPLBLD BASED ON 1.73 SQ M-ARVRAT: 20.2 ML/MIN
GLUCOSE SERPL-MCNC: 123 MG/DL (ref 70–99)
HCT VFR BLD CALC: 25.3 % (ref 39–53)
HGB BLD-MCNC: 8.2 G/DL (ref 13–17.5)
LYMPHOCYTES # BLD: 1.2 X10^3/UL (ref 1–4.8)
LYMPHOCYTES NFR BLD AUTO: 13 % (ref 24–48)
MCH RBC QN AUTO: 28 PG (ref 25–35)
MCHC RBC AUTO-ENTMCNC: 33 G/DL (ref 31–37)
MCV RBC AUTO: 87 FL (ref 79–100)
MONO #: 0.7 X10^3/UL (ref 0–1.1)
MONOCYTES NFR BLD: 8 % (ref 0–9)
NEUT #: 5.8 X10^3UL (ref 1.8–7.7)
NEUTROPHILS NFR BLD AUTO: 68 % (ref 31–73)
PLATELET # BLD AUTO: 257 X10^3/UL (ref 140–400)
POTASSIUM SERPL-SCNC: 4.1 MMOL/L (ref 3.5–5.1)
RBC # BLD AUTO: 2.89 X10^6/UL (ref 4.3–5.7)
SODIUM SERPL-SCNC: 144 MMOL/L (ref 136–145)
WBC # BLD AUTO: 8.6 X10^3/UL (ref 4–11)

## 2018-08-19 RX ADMIN — Medication SCH MG: at 08:23

## 2018-08-19 RX ADMIN — PANTOPRAZOLE SODIUM SCH MG: 40 TABLET, DELAYED RELEASE ORAL at 17:00

## 2018-08-19 RX ADMIN — BACITRACIN SCH MLS/HR: 5000 INJECTION, POWDER, FOR SOLUTION INTRAMUSCULAR at 17:00

## 2018-08-19 RX ADMIN — BACITRACIN SCH MLS/HR: 5000 INJECTION, POWDER, FOR SOLUTION INTRAMUSCULAR at 11:30

## 2018-08-19 RX ADMIN — TAMSULOSIN HYDROCHLORIDE SCH MG: 0.4 CAPSULE ORAL at 20:27

## 2018-08-19 RX ADMIN — SIMVASTATIN SCH MG: 20 TABLET, FILM COATED ORAL at 20:27

## 2018-08-19 RX ADMIN — MULTIPLE VITAMINS W/ MINERALS TAB SCH TAB: TAB at 08:24

## 2018-08-19 RX ADMIN — FINASTERIDE SCH MG: 5 TABLET, FILM COATED ORAL at 20:27

## 2018-08-19 NOTE — PDOC
PROGRESS NOTES


Subjective


Still on IVF, Cr down to 3.0, asymptomatic, wants to go home


Objective


Afebrile


General:  NAD, A&O


Heart:  RRR


Lungs:  CTA


Abd:  soft, normal bowel sounds, non tender


Ext:  No C/C/E





Hgb:  8.2


K+: 4.1


Creat:  3.0, BUN 35


Vital Signs





Vital Signs








  Date Time  Temp Pulse Resp B/P (MAP) Pulse Ox O2 Delivery O2 Flow Rate FiO2


 


8/19/18 11:00 98.8 71 16 141/76 (97) 96 Room Air  





 98.8       








I & O











Intake and Output 


 


 8/19/18





 07:00


 


Intake Total 3509 ml


 


Output Total 2950 ml


 


Balance 559 ml


 


 


 


Intake Oral 1120 ml


 


IV Total 2389 ml


 


Output Urine Total 2950 ml


 


# Voids 3








Assessment and Plan





(1) GLORIA (acute kidney injury) - continue IV and oral hydration, it appears he 

may be close to his new baseline


(2) HTN -stable off meds


(3) DM 2 - controlled, continue monitoring


(4) anemia of chronic renal disease - gradual decrease probably due to hydration


(5) BPH











ALEKSANDAR MIKE MD Aug 19, 2018 12:39

## 2018-08-20 VITALS — DIASTOLIC BLOOD PRESSURE: 72 MMHG | SYSTOLIC BLOOD PRESSURE: 138 MMHG

## 2018-08-20 VITALS — DIASTOLIC BLOOD PRESSURE: 69 MMHG | SYSTOLIC BLOOD PRESSURE: 133 MMHG

## 2018-08-20 VITALS
SYSTOLIC BLOOD PRESSURE: 132 MMHG | DIASTOLIC BLOOD PRESSURE: 71 MMHG | DIASTOLIC BLOOD PRESSURE: 71 MMHG | SYSTOLIC BLOOD PRESSURE: 132 MMHG | DIASTOLIC BLOOD PRESSURE: 71 MMHG | SYSTOLIC BLOOD PRESSURE: 132 MMHG

## 2018-08-20 LAB
ANION GAP SERPL CALC-SCNC: 10 MMOL/L (ref 6–14)
BASOPHILS # BLD AUTO: 0.2 X10^3/UL (ref 0–0.2)
BASOPHILS NFR BLD: 2 % (ref 0–3)
BUN SERPL-MCNC: 32 MG/DL (ref 8–26)
CALCIUM SERPL-MCNC: 8.7 MG/DL (ref 8.5–10.1)
CHLORIDE SERPL-SCNC: 110 MMOL/L (ref 98–107)
CO2 SERPL-SCNC: 23 MMOL/L (ref 21–32)
CREAT SERPL-MCNC: 2.9 MG/DL (ref 0.7–1.3)
EOSINOPHIL NFR BLD: 0.6 X10^3/UL (ref 0–0.7)
EOSINOPHIL NFR BLD: 7 % (ref 0–3)
ERYTHROCYTE [DISTWIDTH] IN BLOOD BY AUTOMATED COUNT: 14.1 % (ref 11.5–14.5)
GFR SERPLBLD BASED ON 1.73 SQ M-ARVRAT: 21 ML/MIN
GLUCOSE SERPL-MCNC: 155 MG/DL (ref 70–99)
HCT VFR BLD CALC: 28.2 % (ref 39–53)
HGB BLD-MCNC: 9.6 G/DL (ref 13–17.5)
LYMPHOCYTES # BLD: 1 X10^3/UL (ref 1–4.8)
LYMPHOCYTES NFR BLD AUTO: 11 % (ref 24–48)
MCH RBC QN AUTO: 30 PG (ref 25–35)
MCHC RBC AUTO-ENTMCNC: 34 G/DL (ref 31–37)
MCV RBC AUTO: 87 FL (ref 79–100)
MONO #: 0.7 X10^3/UL (ref 0–1.1)
MONOCYTES NFR BLD: 7 % (ref 0–9)
NEUT #: 6.9 X10^3UL (ref 1.8–7.7)
NEUTROPHILS NFR BLD AUTO: 73 % (ref 31–73)
PLATELET # BLD AUTO: 276 X10^3/UL (ref 140–400)
POTASSIUM SERPL-SCNC: 3.9 MMOL/L (ref 3.5–5.1)
RBC # BLD AUTO: 3.24 X10^6/UL (ref 4.3–5.7)
SODIUM SERPL-SCNC: 143 MMOL/L (ref 136–145)
WBC # BLD AUTO: 9.5 X10^3/UL (ref 4–11)

## 2018-08-20 RX ADMIN — Medication SCH MG: at 08:50

## 2018-08-20 RX ADMIN — BACITRACIN SCH MLS/HR: 5000 INJECTION, POWDER, FOR SOLUTION INTRAMUSCULAR at 00:55

## 2018-08-20 RX ADMIN — MULTIPLE VITAMINS W/ MINERALS TAB SCH TAB: TAB at 08:50

## 2018-08-20 NOTE — PDOC3
Discharge Summary [facility]


Date of Admission:  Aug 16, 2018


Discharge Date:  Aug 20, 2018


Admitting Diagnosis


GLORIA


Final Diagnosis


Problems


Medical Problems:


(1) GLORIA (acute kidney injury)


Status: Acute  








CONSULTS


renal


Procedures


none


Brief Hospital Course


Mr. Garg  is a 83 old  who presented with:


(1) GLORIA (acute kidney injury) - treated with IV and oral hydration, likely 

precipitated by overtreated hypertension after developing anemia from a GI 

bleed.  He has CKD with a baseline creatinine of 2.2 and is now at 2.9


(2) HTN -stable off meds during hospital stay but his BP is trending up and he 

is started back on lisinopril 10 mg daily


(3) DM 2 - controlled, continued routine meds


(4) anemia of chronic renal disease and recent GI bleed- remains on pantoprazole


(5) BPH - stable, meds continued


(6) AAA - stable on recent exam and infrarenal





Disposition


home


CONDITION AT DISCHARGE:  Improved, Stable


Scheduled


Finasteride (Finasteride), 1 TAB PO HS, (Reported)


Lisinopril/Hydrochlorothiazide (Lisinopril-Hctz 20-12.5 Mg Tab), 1 TAB PO DAILY,

 (Reported)


Multivitamin (Multi-Vitamin Daily), 1 EACH PO DAILY, (Reported)


Omega-3/Dha/Epa/Fish Oil (Fish Oil Omega-3 EC 1,200 mg), 1 EACH PO DAILY, (

Reported)


Simvastatin (Simvastatin), 1 TAB PO DAILY, (Reported)


Tamsulosin Hcl (Flomax), 1 CAP PO HS, (Reported)


[Pantoprazole], 40 MG PO BIDAC





Scheduled PRN


Ondansetron (Zofran Odt), 1 TAB SL Q4HRS PRN for VOMITING





Discontinued Medications


Ascorbic Acid (Vitamin C), 500 MG PO BID, (Reported)


Calcium Carbonate/Vitamin D3 (Calcium 600 + Vit D 400 Softgl), 1 EACH PO BID, (

Reported)


Cyanocobalamin (Vitamin B-12) (Vitamin B-12), 2,500 MCG SL DAILY, (Reported)


Magnesium Oxide (Magnesium), 1 CAP PO DAILY, (Reported)


Follow Up


Thursday in office with BMP, CBC and with renal











ALEKSANDAR MIKE MD Aug 20, 2018 12:50

## 2018-10-05 ENCOUNTER — HOSPITAL ENCOUNTER (OUTPATIENT)
Dept: HOSPITAL 61 - LAB | Age: 83
Discharge: HOME | End: 2018-10-05
Attending: INTERNAL MEDICINE
Payer: MEDICARE

## 2018-10-05 DIAGNOSIS — Z79.84: ICD-10-CM

## 2018-10-05 DIAGNOSIS — N18.9: ICD-10-CM

## 2018-10-05 DIAGNOSIS — E11.22: ICD-10-CM

## 2018-10-05 DIAGNOSIS — I12.9: Primary | ICD-10-CM

## 2018-10-05 DIAGNOSIS — Z79.4: ICD-10-CM

## 2018-10-05 DIAGNOSIS — D63.1: ICD-10-CM

## 2018-10-05 LAB
% BANDS: 1 % (ref 0–9)
% LYMPHS: 16 % (ref 24–48)
% MONOS: 7 % (ref 0–10)
% SEGS: 63 % (ref 35–66)
ALBUMIN SERPL-MCNC: 3.9 G/DL (ref 3.4–5)
ANION GAP SERPL CALC-SCNC: 7 MMOL/L (ref 6–14)
ANISOCYTOSIS BLD QL SMEAR: SLIGHT
APTT PPP: YELLOW S
BACTERIA #/AREA URNS HPF: (no result) /HPF
BASOPHILS # BLD AUTO: 0.2 X10^3/UL (ref 0–0.2)
BASOPHILS NFR BLD AUTO: 4 % (ref 0–3)
BASOPHILS NFR BLD: 3 % (ref 0–3)
BILIRUB UR QL STRIP: NEGATIVE
BUN SERPL-MCNC: 34 MG/DL (ref 8–26)
CALCIUM SERPL-MCNC: 10.3 MG/DL (ref 8.5–10.1)
CHLORIDE SERPL-SCNC: 105 MMOL/L (ref 98–107)
CO2 SERPL-SCNC: 29 MMOL/L (ref 21–32)
CREAT SERPL-MCNC: 2.2 MG/DL (ref 0.7–1.3)
EOSINOPHIL NFR BLD AUTO: 9 % (ref 0–5)
EOSINOPHIL NFR BLD: 0.4 X10^3/UL (ref 0–0.7)
EOSINOPHIL NFR BLD: 5 % (ref 0–3)
ERYTHROCYTE [DISTWIDTH] IN BLOOD BY AUTOMATED COUNT: 16.9 % (ref 11.5–14.5)
FIBRINOGEN PPP-MCNC: CLEAR MG/DL
GFR SERPLBLD BASED ON 1.73 SQ M-ARVRAT: 28.9 ML/MIN
GLUCOSE SERPL-MCNC: 110 MG/DL (ref 70–99)
HCT VFR BLD CALC: 32.3 % (ref 39–53)
HGB BLD-MCNC: 10.8 G/DL (ref 13–17.5)
LYMPHOCYTES # BLD: 1.3 X10^3/UL (ref 1–4.8)
LYMPHOCYTES NFR BLD AUTO: 18 % (ref 24–48)
MCH RBC QN AUTO: 30 PG (ref 25–35)
MCHC RBC AUTO-ENTMCNC: 33 G/DL (ref 31–37)
MCV RBC AUTO: 90 FL (ref 79–100)
MONO #: 0.7 X10^3/UL (ref 0–1.1)
MONOCYTES NFR BLD: 9 % (ref 0–9)
NEUT #: 5 X10^3UL (ref 1.8–7.7)
NEUTROPHILS NFR BLD AUTO: 65 % (ref 31–73)
NITRITE UR QL STRIP: NEGATIVE
PH UR STRIP: 6.5 [PH]
PHOSPHATE SERPL-MCNC: 3.2 MG/DL (ref 2.6–4.7)
PLATELET # BLD AUTO: 218 X10^3/UL (ref 140–400)
PLATELET # BLD EST: ADEQUATE 10*3/UL
POTASSIUM SERPL-SCNC: 4 MMOL/L (ref 3.5–5.1)
PROT UR STRIP-MCNC: NEGATIVE MG/DL
PROT UR-MCNC: 9.4 MG/DL
PROT/CREAT UR-RTO: 131 MG/G CREAT (ref 0–200)
RBC # BLD AUTO: 3.61 X10^6/UL (ref 4.3–5.7)
RBC #/AREA URNS HPF: (no result) /HPF (ref 0–2)
SODIUM SERPL-SCNC: 141 MMOL/L (ref 136–145)
SQUAMOUS #/AREA URNS LPF: (no result) /LPF
UROBILINOGEN UR-MCNC: 0.2 MG/DL
WBC # BLD AUTO: 7.7 X10^3/UL (ref 4–11)
WBC #/AREA URNS HPF: (no result) /HPF (ref 0–4)

## 2018-10-05 PROCEDURE — 80069 RENAL FUNCTION PANEL: CPT

## 2018-10-05 PROCEDURE — 85007 BL SMEAR W/DIFF WBC COUNT: CPT

## 2018-10-05 PROCEDURE — 36415 COLL VENOUS BLD VENIPUNCTURE: CPT

## 2018-10-05 PROCEDURE — 85025 COMPLETE CBC W/AUTO DIFF WBC: CPT

## 2018-10-05 PROCEDURE — 87086 URINE CULTURE/COLONY COUNT: CPT

## 2018-10-05 PROCEDURE — 81001 URINALYSIS AUTO W/SCOPE: CPT

## 2018-10-05 PROCEDURE — 82570 ASSAY OF URINE CREATININE: CPT

## 2018-10-05 PROCEDURE — 84156 ASSAY OF PROTEIN URINE: CPT

## 2018-11-21 ENCOUNTER — HOSPITAL ENCOUNTER (OUTPATIENT)
Dept: HOSPITAL 61 - LAB | Age: 83
Discharge: HOME | End: 2018-11-21
Attending: NURSE PRACTITIONER
Payer: MEDICARE

## 2018-11-21 DIAGNOSIS — E11.22: ICD-10-CM

## 2018-11-21 DIAGNOSIS — I12.9: Primary | ICD-10-CM

## 2018-11-21 DIAGNOSIS — D64.9: ICD-10-CM

## 2018-11-21 DIAGNOSIS — N18.4: ICD-10-CM

## 2018-11-21 DIAGNOSIS — N17.9: ICD-10-CM

## 2018-11-21 LAB
ALBUMIN SERPL-MCNC: 4.1 G/DL (ref 3.4–5)
ANION GAP SERPL CALC-SCNC: 11 MMOL/L (ref 6–14)
BASOPHILS # BLD AUTO: 0.1 X10^3/UL (ref 0–0.2)
BASOPHILS NFR BLD: 1 % (ref 0–3)
BUN SERPL-MCNC: 37 MG/DL (ref 8–26)
CALCIUM SERPL-MCNC: 10.3 MG/DL (ref 8.5–10.1)
CHLORIDE SERPL-SCNC: 101 MMOL/L (ref 98–107)
CO2 SERPL-SCNC: 26 MMOL/L (ref 21–32)
CREAT SERPL-MCNC: 2.3 MG/DL (ref 0.7–1.3)
EOSINOPHIL NFR BLD: 0.1 X10^3/UL (ref 0–0.7)
EOSINOPHIL NFR BLD: 1 % (ref 0–3)
ERYTHROCYTE [DISTWIDTH] IN BLOOD BY AUTOMATED COUNT: 14.5 % (ref 11.5–14.5)
GFR SERPLBLD BASED ON 1.73 SQ M-ARVRAT: 27.4 ML/MIN
GLUCOSE SERPL-MCNC: 122 MG/DL (ref 70–99)
HBV SURFACE AB SER RIA-ACNC: 161.3 MG/DL
HBV SURFACE AG SERPL QL IA: 56 UG/ML
HCT VFR BLD CALC: 32.1 % (ref 39–53)
HGB BLD-MCNC: 10.7 G/DL (ref 13–17.5)
LYMPHOCYTES # BLD: 0.9 X10^3/UL (ref 1–4.8)
LYMPHOCYTES NFR BLD AUTO: 9 % (ref 24–48)
MAGNESIUM SERPL-MCNC: 1.8 MG/DL (ref 1.8–2.4)
MCH RBC QN AUTO: 30 PG (ref 25–35)
MCHC RBC AUTO-ENTMCNC: 33 G/DL (ref 31–37)
MCV RBC AUTO: 92 FL (ref 79–100)
MICRO CREAT RATIO: 34.7 MG/G CREAT (ref 0–30)
MONO #: 0.7 X10^3/UL (ref 0–1.1)
MONOCYTES NFR BLD: 8 % (ref 0–9)
NEUT #: 7.9 X10^3UL (ref 1.8–7.7)
NEUTROPHILS NFR BLD AUTO: 81 % (ref 31–73)
PHOSPHATE SERPL-MCNC: 2.5 MG/DL (ref 2.6–4.7)
PLATELET # BLD AUTO: 187 X10^3/UL (ref 140–400)
POTASSIUM SERPL-SCNC: 3.9 MMOL/L (ref 3.5–5.1)
PROT UR-MCNC: 20.2 MG/DL
PROT/CREAT UR-RTO: 124 MG/G CREAT (ref 0–200)
RBC # BLD AUTO: 3.51 X10^6/UL (ref 4.3–5.7)
SODIUM SERPL-SCNC: 138 MMOL/L (ref 136–145)
URATE SERPL-MCNC: 7.2 MG/DL (ref 3.5–7.2)
WBC # BLD AUTO: 9.7 X10^3/UL (ref 4–11)

## 2018-11-21 PROCEDURE — 83970 ASSAY OF PARATHORMONE: CPT

## 2018-11-21 PROCEDURE — 85025 COMPLETE CBC W/AUTO DIFF WBC: CPT

## 2018-11-21 PROCEDURE — 82728 ASSAY OF FERRITIN: CPT

## 2018-11-21 PROCEDURE — 83550 IRON BINDING TEST: CPT

## 2018-11-21 PROCEDURE — 84156 ASSAY OF PROTEIN URINE: CPT

## 2018-11-21 PROCEDURE — 82570 ASSAY OF URINE CREATININE: CPT

## 2018-11-21 PROCEDURE — 84550 ASSAY OF BLOOD/URIC ACID: CPT

## 2018-11-21 PROCEDURE — 83735 ASSAY OF MAGNESIUM: CPT

## 2018-11-21 PROCEDURE — 82043 UR ALBUMIN QUANTITATIVE: CPT

## 2018-11-21 PROCEDURE — 83540 ASSAY OF IRON: CPT

## 2018-11-21 PROCEDURE — 80069 RENAL FUNCTION PANEL: CPT

## 2018-11-21 PROCEDURE — 36415 COLL VENOUS BLD VENIPUNCTURE: CPT

## 2018-11-22 LAB
CALCIUM PTH: 10.5 MG/DL (ref 8.6–10.2)
CREATININE PTH: 2.16 MG/DL (ref 0.76–1.27)
PHOSPHORUS PTH: 2.5 MG/DL (ref 2.5–4.5)
PTH-INTACT SERPL-MCNC: 19 PG/ML (ref 15–65)

## 2019-04-08 ENCOUNTER — HOSPITAL ENCOUNTER (OUTPATIENT)
Dept: HOSPITAL 61 - SURG | Age: 84
Setting detail: OBSERVATION
LOS: 1 days | Discharge: HOME | End: 2019-04-09
Attending: SURGERY | Admitting: SURGERY
Payer: MEDICARE

## 2019-04-08 VITALS — WEIGHT: 200.37 LBS | HEIGHT: 69 IN | BODY MASS INDEX: 29.68 KG/M2

## 2019-04-08 VITALS — DIASTOLIC BLOOD PRESSURE: 54 MMHG | SYSTOLIC BLOOD PRESSURE: 113 MMHG

## 2019-04-08 VITALS — DIASTOLIC BLOOD PRESSURE: 53 MMHG | SYSTOLIC BLOOD PRESSURE: 111 MMHG

## 2019-04-08 VITALS — DIASTOLIC BLOOD PRESSURE: 82 MMHG | SYSTOLIC BLOOD PRESSURE: 147 MMHG

## 2019-04-08 DIAGNOSIS — H26.9: ICD-10-CM

## 2019-04-08 DIAGNOSIS — Z82.49: ICD-10-CM

## 2019-04-08 DIAGNOSIS — R94.31: ICD-10-CM

## 2019-04-08 DIAGNOSIS — Z98.890: ICD-10-CM

## 2019-04-08 DIAGNOSIS — E11.22: ICD-10-CM

## 2019-04-08 DIAGNOSIS — F41.9: ICD-10-CM

## 2019-04-08 DIAGNOSIS — Z87.11: ICD-10-CM

## 2019-04-08 DIAGNOSIS — I12.9: ICD-10-CM

## 2019-04-08 DIAGNOSIS — Z87.891: ICD-10-CM

## 2019-04-08 DIAGNOSIS — M19.90: ICD-10-CM

## 2019-04-08 DIAGNOSIS — N18.9: ICD-10-CM

## 2019-04-08 DIAGNOSIS — K40.90: Primary | ICD-10-CM

## 2019-04-08 DIAGNOSIS — I71.4: ICD-10-CM

## 2019-04-08 PROCEDURE — C1781 MESH (IMPLANTABLE): HCPCS

## 2019-04-08 PROCEDURE — 49505 PRP I/HERN INIT REDUC >5 YR: CPT

## 2019-04-08 PROCEDURE — G0379 DIRECT REFER HOSPITAL OBSERV: HCPCS

## 2019-04-08 PROCEDURE — A7015 AEROSOL MASK USED W NEBULIZE: HCPCS

## 2019-04-08 PROCEDURE — 82962 GLUCOSE BLOOD TEST: CPT

## 2019-04-08 PROCEDURE — G0378 HOSPITAL OBSERVATION PER HR: HCPCS

## 2019-04-08 PROCEDURE — 96374 THER/PROPH/DIAG INJ IV PUSH: CPT

## 2019-04-08 RX ADMIN — FENTANYL CITRATE PRN MCG: 50 INJECTION INTRAMUSCULAR; INTRAVENOUS at 11:30

## 2019-04-08 RX ADMIN — OXYCODONE HYDROCHLORIDE AND ACETAMINOPHEN SCH MG: 500 TABLET ORAL at 20:12

## 2019-04-08 RX ADMIN — SODIUM CHLORIDE SCH MLS/HR: 450 INJECTION, SOLUTION INTRAVENOUS at 11:18

## 2019-04-08 RX ADMIN — FENTANYL CITRATE PRN MCG: 50 INJECTION INTRAMUSCULAR; INTRAVENOUS at 12:30

## 2019-04-08 RX ADMIN — FENTANYL CITRATE PRN MCG: 50 INJECTION INTRAMUSCULAR; INTRAVENOUS at 12:54

## 2019-04-08 RX ADMIN — FENTANYL CITRATE PRN MCG: 50 INJECTION INTRAMUSCULAR; INTRAVENOUS at 12:00

## 2019-04-08 RX ADMIN — PANTOPRAZOLE SODIUM SCH MG: 40 TABLET, DELAYED RELEASE ORAL at 16:16

## 2019-04-08 RX ADMIN — HYDROCODONE BITARTRATE AND ACETAMINOPHEN PRN TAB: 5; 325 TABLET ORAL at 21:18

## 2019-04-08 RX ADMIN — HYDROCODONE BITARTRATE AND ACETAMINOPHEN PRN TAB: 5; 325 TABLET ORAL at 16:17

## 2019-04-09 VITALS
SYSTOLIC BLOOD PRESSURE: 129 MMHG | SYSTOLIC BLOOD PRESSURE: 129 MMHG | DIASTOLIC BLOOD PRESSURE: 61 MMHG | DIASTOLIC BLOOD PRESSURE: 61 MMHG

## 2019-04-09 RX ADMIN — PANTOPRAZOLE SODIUM SCH MG: 40 TABLET, DELAYED RELEASE ORAL at 05:41

## 2019-04-09 RX ADMIN — HYDROCODONE BITARTRATE AND ACETAMINOPHEN PRN TAB: 5; 325 TABLET ORAL at 06:23

## 2019-04-09 RX ADMIN — OXYCODONE HYDROCHLORIDE AND ACETAMINOPHEN SCH MG: 500 TABLET ORAL at 08:20

## 2019-04-09 RX ADMIN — SODIUM CHLORIDE SCH MLS/HR: 450 INJECTION, SOLUTION INTRAVENOUS at 01:36

## 2019-04-09 NOTE — DISCH
DISCHARGE INSTRUCTIONS


Condition on Discharge


Condition on Discharge:  Stable





Activity After Discharge


Activity Instructions for Disc:  Activity as tolerated


Other activity instructions:  ok to shower


Lifting Instructions after Dis:  No heavy lifting, No pulling or pushing, Do 

not lift >10 pounds


Driving Instructions after Dis:  Do not drive


Weight Bearing Status after Di:  Full weight bearing





Diet after Discharge


Diet after Discharge:  Low Fat, Low Sodium 2 gm


Diet Texture:  Regular


Liquid Texture:  Thin Liquid





Wound Incision Care


Wound/Incision Care:  No wound care needed


Other wound/incision instructi:  ice as needed





Contacting the [facility] after DC


Call your doctor for:  Concerns you may have





Follow-Up


Follow up with:  Dr Conrad 2 weeks, call to schedule [phone]





Treatment/Equipment after DC


Adaptive Equipment Issued:  None











CHEIKH VILLA Apr 9, 2019 09:23

## 2019-04-09 NOTE — NUR
Pt. discharged to home with Rx, verbalized understanding of discharge instructions. Lower 
abd incision CDI.

## 2019-04-09 NOTE — NUR
SW following for discharge planning. Discussed with RN, pt is from home with wife, RN 
anticipates discharge home today. RN advised no SW needs.

## 2019-04-09 NOTE — CONS
DATE OF CONSULTATION:  2019



REASON FOR CONSULTATION:  Medical management of BPH and renal insufficiency.



HISTORY OF PRESENT ILLNESS AND HOSPITAL COURSE:  This patient is an 84-year-old

 male who had elective admission for surgery of right inguinal hernia. 

This was accomplished with mesh placement by Dr. Conrad.  The patient is having

uncomplicated postoperative course, has minimal complaints of pain, has IV

fluids in process and is being started on clear liquids, tolerating pain well. 

He is doing well postop as mentioned.



PAST MEDICAL HISTORY:  Significant for

1.  Type 2 diabetes, diet controlled.

2.  Hypertension, diet controlled.

3.  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

4.  Osteoarthritis.

5.  History of peptic ulcer disease.

6.  BPH.



MEDICATIONS:  The patient's medications are as follows:  Simvastatin 20 mg

daily, pantoprazole 40 mg b.i.d., finasteride 5 mg daily, Flomax 0.4 daily,

aspirin 81 mg daily, vitamin C b.i.d., vitamin D 2000 International Units daily,

Krill oil 500 mg daily, Centrum Silver 1 daily, calcium carbonate 600 mg b.i.d.



PAST SURGICAL HISTORY:  Significant for laparoscopic repair of perforated

duodenal ulcer in 2018, cataract surgery, vasectomy.



FAMILY HISTORY:  Mother  with complications of a stroke, father  with

heart attack at age 87.  Brother has  in a plane accident at age 70.



SOCIAL HISTORY:  The patient is a former smoker.  The patient does drink, but

less than 2-3 beers per week.



ALLERGIES:  The patient has no known drug allergies.



REVIEW OF SYSTEMS:  The patient is doing well except for increasingly painful

and increasing size of right inguinal hernia.



PHYSICAL EXAMINATION:

GENERAL:  This is a well-nourished, well-developed  male, in no

apparent distress postoperatively standing and walking, tolerating clear

liquids, IV fluids running with adequate pain control.

HEENT:  Benign.

NECK:  Supple.

CARDIAC:  Regular rate and rhythm.

LUNGS:  Clear.

ABDOMEN:  Soft and nontender.

EXTREMITIES:  2+ pulses without significant edema.

NEUROLOGIC:  Showed no unilateral findings.



ASSESSMENT:

1.  Immediately post-surgery, right inguinal hernia repair with mesh.

2.  Diet controlled type 2 diabetes.

3.  Diet controlled hypertension.

4.  Chronic renal insufficiency with baseline creatinine of 1.7, followed by

Renal as an outpatient.



PLAN:  To proceed with postoperative care.  Monitor diet and pain control and

discharged to home when stable within the next 24 hours.  Monitor ability to

urinate and guard for urinary retention due to BPH.

 



______________________________

FREDRICK RUSSELL MD



DR:  MARCELO/danni  JOB#:  [id number] / [id number]

DD:  2019 13:44  DT:  2019 04:33

## 2019-04-11 NOTE — PDOC3
Discharge Summary


Visit Information


Date of Admission:  Apr 8, 2019


Date of Discharge:  Apr 9, 2019


Admitting Diagnosis:   Right inguinal hernia


Final Diagnosis


 Right inguinal hernia





Brief Hospital Course


Allergies





 Allergies








Coded Allergies Type Severity Reaction Last Updated Verified


 


  No Known Drug Allergies    4/8/19 No








Brief Hospital Course


Mr. Garg  is a 84 old male who underwent  Right inguinal hernia repair with 

mesh.  Postoperatively tolerating diet, ambulating, and pain managed.  Ready 

for discharge home





Discharge Information


Condition at Discharge:  Stable


Follow Up:  Weeks (2)


Scheduled


Ascorbic Acid (Vitamin C) 1,000 Mg Tablet, 1,000 MG PO BID for SUPPLEMENT, (

Reported)


   Entered as Reported by: ELOY FREEDMAN on 4/8/19 0828


   Last Taken: Unknown Dose on 4/4/19      Last Action: Converted on 4/8/19 1122 by OTIS RODNEY


Aspirin (Aspir-Low) 81 Mg Tablet.dr, 81 MG PO DAILY for UNKNOWN, (Reported)


   Entered as Reported by: ELOY FREEDMAN on 4/8/19 0828


   Last Taken: Unknown Dose on 4/3/19      Last Action: Continued on 4/8/19 1122 by OTIS RODNEY


Calcium Carbonate (Calcium Carbonate) 300 Mg Tab.chew, 600 MG PO DAILY for 

UNKNOWN, (Reported)


   Entered as Reported by: ELOY FREEDMAN on 4/8/19 0830


   Last Taken: Unknown Dose on 4/4/19      Last Action: Converted on 4/8/19 1122 by OTIS RODNEY


Cholecalciferol (Vitamin D3) (Vitamin D3) 1,000 Unit Tablet, 2,000 UNIT PO 

DAILY for SUPPLEMENT, (Reported)


   Entered as Reported by: ELOY FREEDMAN on 4/8/19 0829


   Last Taken: Unknown Dose on 4/4/19      Last Action: Continued on 4/8/19 1122 by OTIS RODNEY


Finasteride (Finasteride) 5 Mg Tablet, 1 TAB PO HS, #30 Ref 11 (Reported)


   Entered as Reported by: ERICKA IVERSON on 8/16/18 2252


   Last Taken: Unknown Dose on 4/8/19 0600     Last Action: Continued on 4/8/19 1122 by OTIS RODNEY


Krill Oil (Krill Oil) 500 Mg Capsule, 500 MG PO DAILY for SUPPLEMENT, (Reported)


   Entered as Reported by: ELOY FREEDMAN on 4/8/19 0830


   Last Taken: Unknown Dose on 4/4/19      Last Action: Converted on 4/8/19 1122 by OTIS RODNEY


Multivitamin (Multi-Vitamin Daily) 1 Each Tablet, 1 EACH PO DAILY, (Reported)


   Entered as Reported by: ERICKA IVERSON on 8/16/18 2300


   Last Taken: Unknown Dose on 4/4/19      Last Action: Converted on 4/8/19 1122 by OTIS RODNEY


Simvastatin (Simvastatin) 20 Mg Tablet, 1 TAB PO DAILY, #30 Ref 5 (Reported)


   Entered as Reported by: MENDEZ NATION on 12/23/15 1557


   Last Taken: Unknown Dose on 4/7/19 2100     Last Action: Converted on 4/8/19 1122 by OTIS RODNEY


Tamsulosin Hcl (Flomax) 0.4 Mg Cap.er.24h, 1 CAP PO HS, #30 Ref 11 (Reported)


   Entered as Reported by: ERICKA IVERSON on 8/16/18 2252


   Last Taken: Unknown Dose on 4/7/19      Last Action: Continued on 4/8/19 1122 by OTIS RODNEY


[Pantoprazole] 40 MG TABLET., 40 MG PO BIDAC, #60 Ref 0


   Prescribed by: Cheikh Schaeffer on 6/22/18 0848


   Last Taken: Unknown Dose on 4/8/19 0600     Last Action: Converted on 4/8/19 1122 by OTIS RODNEY





Scheduled PRN


Hydrocodone Bit/Acetaminophen (Hydrocodone-Apap 5-325  **) 1 Tab Tablet, 1 TAB 

PO PRN Q4HRS PRN for MILD PAIN for 30 Days, #45


   Prescribed by: FREDRICK RUSSELL on 4/9/19 0858





Discontinued Medications


Finasteride (Finasteride) 5 Mg Tablet, 5 MG PO DAILY for UNKNOWN, (Reported)


   Entered as Reported by: ELOY FREEDMAN on 4/8/19 0827


   Last Action: Discontinued on 4/8/19 0828 by ELOY FREEDMAN


[place]-3/Dha/Epa/Fish Oil (Fish Oil Omega-3 EC 1,200 mg) 1 Each Capsule., 1 

EACH PO DAILY, (Reported)


   Entered as Reported by: Jennifer Marc on 12/23/15 2155


   Last Action: Discontinued on 4/8/19 0855 by CHEIKH DODGE Apr 11, 2019 11:42

## 2019-04-23 ENCOUNTER — HOSPITAL ENCOUNTER (OUTPATIENT)
Dept: HOSPITAL 61 - LAB | Age: 84
Discharge: HOME | End: 2019-04-23
Attending: NURSE PRACTITIONER
Payer: MEDICARE

## 2019-04-23 DIAGNOSIS — N17.9: Primary | ICD-10-CM

## 2019-04-23 DIAGNOSIS — I12.9: ICD-10-CM

## 2019-04-23 DIAGNOSIS — E11.21: ICD-10-CM

## 2019-04-23 DIAGNOSIS — N18.4: ICD-10-CM

## 2019-04-23 DIAGNOSIS — D64.9: ICD-10-CM

## 2019-04-23 DIAGNOSIS — Z79.84: ICD-10-CM

## 2019-04-23 LAB
ALBUMIN SERPL-MCNC: 3.8 G/DL (ref 3.4–5)
ANION GAP SERPL CALC-SCNC: 9 MMOL/L (ref 6–14)
BASOPHILS # BLD AUTO: 0.1 X10^3/UL (ref 0–0.2)
BASOPHILS NFR BLD: 2 % (ref 0–3)
BUN SERPL-MCNC: 25 MG/DL (ref 8–26)
CALCIUM PTH: 9.7 MG/DL (ref 8.6–10.2)
CALCIUM SERPL-MCNC: 10 MG/DL (ref 8.5–10.1)
CHLORIDE SERPL-SCNC: 104 MMOL/L (ref 98–107)
CO2 SERPL-SCNC: 30 MMOL/L (ref 21–32)
CREAT SERPL-MCNC: 1.6 MG/DL (ref 0.7–1.3)
CREATININE PTH: 1.56 MG/DL (ref 0.76–1.27)
CREATININE,RANDOM URINE: 26.5 MG/DL
EOSINOPHIL NFR BLD: 0.3 X10^3/UL (ref 0–0.7)
EOSINOPHIL NFR BLD: 4 % (ref 0–3)
ERYTHROCYTE [DISTWIDTH] IN BLOOD BY AUTOMATED COUNT: 13.3 % (ref 11.5–14.5)
GFR SERPLBLD BASED ON 1.73 SQ M-ARVRAT: 41.6 ML/MIN
GLUCOSE SERPL-MCNC: 107 MG/DL (ref 70–99)
HBV SURFACE AB SER RIA-ACNC: 27.9 MG/DL
HBV SURFACE AG SERPL QL IA: 6.9 UG/ML
HCT VFR BLD CALC: 32.8 % (ref 39–53)
HGB BLD-MCNC: 10.8 G/DL (ref 13–17.5)
LYMPHOCYTES # BLD: 1.5 X10^3/UL (ref 1–4.8)
LYMPHOCYTES NFR BLD AUTO: 21 % (ref 24–48)
MAGNESIUM SERPL-MCNC: 1.8 MG/DL (ref 1.8–2.4)
MCH RBC QN AUTO: 31 PG (ref 25–35)
MCHC RBC AUTO-ENTMCNC: 33 G/DL (ref 31–37)
MCV RBC AUTO: 95 FL (ref 79–100)
MICRO CREAT RATIO: 24.7 MG/G CREAT (ref 0–30)
MONO #: 0.7 X10^3/UL (ref 0–1.1)
MONOCYTES NFR BLD: 10 % (ref 0–9)
NEUT #: 4.4 X10^3UL (ref 1.8–7.7)
NEUTROPHILS NFR BLD AUTO: 63 % (ref 31–73)
PHOSPHATE SERPL-MCNC: 3.4 MG/DL (ref 2.6–4.7)
PHOSPHORUS PTH: 3.1 MG/DL (ref 2.5–4.5)
PLATELET # BLD AUTO: 205 X10^3/UL (ref 140–400)
POTASSIUM SERPL-SCNC: 4.8 MMOL/L (ref 3.5–5.1)
PTH-INTACT SERPL-MCNC: 26 PG/ML (ref 15–65)
RBC # BLD AUTO: 3.44 X10^6/UL (ref 4.3–5.7)
SODIUM SERPL-SCNC: 143 MMOL/L (ref 136–145)
URATE SERPL-MCNC: 5.9 MG/DL (ref 3.5–7.2)
WBC # BLD AUTO: 6.9 X10^3/UL (ref 4–11)

## 2019-04-23 PROCEDURE — 36415 COLL VENOUS BLD VENIPUNCTURE: CPT

## 2019-04-23 PROCEDURE — 84156 ASSAY OF PROTEIN URINE: CPT

## 2019-04-23 PROCEDURE — 84550 ASSAY OF BLOOD/URIC ACID: CPT

## 2019-04-23 PROCEDURE — 83970 ASSAY OF PARATHORMONE: CPT

## 2019-04-23 PROCEDURE — 83540 ASSAY OF IRON: CPT

## 2019-04-23 PROCEDURE — 83735 ASSAY OF MAGNESIUM: CPT

## 2019-04-23 PROCEDURE — 85025 COMPLETE CBC W/AUTO DIFF WBC: CPT

## 2019-04-23 PROCEDURE — 82043 UR ALBUMIN QUANTITATIVE: CPT

## 2019-04-23 PROCEDURE — 82570 ASSAY OF URINE CREATININE: CPT

## 2019-04-23 PROCEDURE — 82728 ASSAY OF FERRITIN: CPT

## 2019-04-23 PROCEDURE — 83550 IRON BINDING TEST: CPT

## 2019-04-23 PROCEDURE — 80069 RENAL FUNCTION PANEL: CPT

## 2019-12-11 ENCOUNTER — HOSPITAL ENCOUNTER (INPATIENT)
Dept: HOSPITAL 61 - ER | Age: 84
LOS: 1 days | Discharge: HOME | DRG: 305 | End: 2019-12-12
Attending: FAMILY MEDICINE | Admitting: FAMILY MEDICINE
Payer: MEDICARE

## 2019-12-11 VITALS — DIASTOLIC BLOOD PRESSURE: 72 MMHG | SYSTOLIC BLOOD PRESSURE: 138 MMHG

## 2019-12-11 VITALS — HEIGHT: 72 IN | WEIGHT: 182.31 LBS | BODY MASS INDEX: 24.69 KG/M2

## 2019-12-11 VITALS — SYSTOLIC BLOOD PRESSURE: 112 MMHG | DIASTOLIC BLOOD PRESSURE: 64 MMHG

## 2019-12-11 VITALS — SYSTOLIC BLOOD PRESSURE: 143 MMHG | DIASTOLIC BLOOD PRESSURE: 69 MMHG

## 2019-12-11 DIAGNOSIS — D64.9: ICD-10-CM

## 2019-12-11 DIAGNOSIS — E11.22: ICD-10-CM

## 2019-12-11 DIAGNOSIS — E78.5: ICD-10-CM

## 2019-12-11 DIAGNOSIS — N40.0: ICD-10-CM

## 2019-12-11 DIAGNOSIS — I16.0: Primary | ICD-10-CM

## 2019-12-11 DIAGNOSIS — N18.9: ICD-10-CM

## 2019-12-11 DIAGNOSIS — I12.9: ICD-10-CM

## 2019-12-11 DIAGNOSIS — K21.9: ICD-10-CM

## 2019-12-11 DIAGNOSIS — M19.90: ICD-10-CM

## 2019-12-11 DIAGNOSIS — Z86.79: ICD-10-CM

## 2019-12-11 DIAGNOSIS — Z82.49: ICD-10-CM

## 2019-12-11 DIAGNOSIS — E78.00: ICD-10-CM

## 2019-12-11 DIAGNOSIS — R07.89: ICD-10-CM

## 2019-12-11 DIAGNOSIS — Z87.11: ICD-10-CM

## 2019-12-11 DIAGNOSIS — F41.9: ICD-10-CM

## 2019-12-11 LAB
ALBUMIN SERPL-MCNC: 3.9 G/DL (ref 3.4–5)
ALBUMIN/GLOB SERPL: 1.1 {RATIO} (ref 1–1.7)
ALP SERPL-CCNC: 69 U/L (ref 46–116)
ALT SERPL-CCNC: 21 U/L (ref 16–63)
ANION GAP SERPL CALC-SCNC: 7 MMOL/L (ref 6–14)
APTT PPP: YELLOW S
AST SERPL-CCNC: 18 U/L (ref 15–37)
BACTERIA #/AREA URNS HPF: 0 /HPF
BASOPHILS # BLD AUTO: 0.1 X10^3/UL (ref 0–0.2)
BASOPHILS NFR BLD: 1 % (ref 0–3)
BILIRUB SERPL-MCNC: 0.4 MG/DL (ref 0.2–1)
BILIRUB UR QL STRIP: NEGATIVE
BUN SERPL-MCNC: 24 MG/DL (ref 8–26)
BUN/CREAT SERPL: 16 (ref 6–20)
CALCIUM SERPL-MCNC: 9.8 MG/DL (ref 8.5–10.1)
CHLORIDE SERPL-SCNC: 104 MMOL/L (ref 98–107)
CO2 SERPL-SCNC: 30 MMOL/L (ref 21–32)
CREAT SERPL-MCNC: 1.5 MG/DL (ref 0.7–1.3)
EOSINOPHIL NFR BLD: 0.2 X10^3/UL (ref 0–0.7)
EOSINOPHIL NFR BLD: 3 % (ref 0–3)
ERYTHROCYTE [DISTWIDTH] IN BLOOD BY AUTOMATED COUNT: 12.9 % (ref 11.5–14.5)
FIBRINOGEN PPP-MCNC: CLEAR MG/DL
GFR SERPLBLD BASED ON 1.73 SQ M-ARVRAT: 44.8 ML/MIN
GLOBULIN SER-MCNC: 3.5 G/DL (ref 2.2–3.8)
GLUCOSE SERPL-MCNC: 104 MG/DL (ref 70–99)
HCT VFR BLD CALC: 35 % (ref 39–53)
HGB BLD-MCNC: 11.6 G/DL (ref 13–17.5)
LIPASE: 173 U/L (ref 73–393)
LYMPHOCYTES # BLD: 1.3 X10^3/UL (ref 1–4.8)
LYMPHOCYTES NFR BLD AUTO: 23 % (ref 24–48)
MAGNESIUM SERPL-MCNC: 1.7 MG/DL (ref 1.8–2.4)
MCH RBC QN AUTO: 31 PG (ref 25–35)
MCHC RBC AUTO-ENTMCNC: 33 G/DL (ref 31–37)
MCV RBC AUTO: 93 FL (ref 79–100)
MONO #: 0.6 X10^3/UL (ref 0–1.1)
MONOCYTES NFR BLD: 10 % (ref 0–9)
NEUT #: 3.7 X10^3/UL (ref 1.8–7.7)
NEUTROPHILS NFR BLD AUTO: 63 % (ref 31–73)
NITRITE UR QL STRIP: NEGATIVE
PH UR STRIP: 7.5 [PH]
PLATELET # BLD AUTO: 196 X10^3/UL (ref 140–400)
POTASSIUM SERPL-SCNC: 4.3 MMOL/L (ref 3.5–5.1)
PROT SERPL-MCNC: 7.4 G/DL (ref 6.4–8.2)
PROT UR STRIP-MCNC: NEGATIVE MG/DL
PROTHROMBIN TIME: 12.1 SEC (ref 11.7–14)
RBC # BLD AUTO: 3.75 X10^6/UL (ref 4.3–5.7)
RBC #/AREA URNS HPF: 0 /HPF (ref 0–2)
SODIUM SERPL-SCNC: 141 MMOL/L (ref 136–145)
SQUAMOUS #/AREA URNS LPF: (no result) /LPF
UROBILINOGEN UR-MCNC: 0.2 MG/DL
WBC # BLD AUTO: 5.8 X10^3/UL (ref 4–11)
WBC #/AREA URNS HPF: 0 /HPF (ref 0–4)

## 2019-12-11 PROCEDURE — 84443 ASSAY THYROID STIM HORMONE: CPT

## 2019-12-11 PROCEDURE — 93005 ELECTROCARDIOGRAM TRACING: CPT

## 2019-12-11 PROCEDURE — 80061 LIPID PANEL: CPT

## 2019-12-11 PROCEDURE — G0378 HOSPITAL OBSERVATION PER HR: HCPCS

## 2019-12-11 PROCEDURE — 85025 COMPLETE CBC W/AUTO DIFF WBC: CPT

## 2019-12-11 PROCEDURE — 71045 X-RAY EXAM CHEST 1 VIEW: CPT

## 2019-12-11 PROCEDURE — 83036 HEMOGLOBIN GLYCOSYLATED A1C: CPT

## 2019-12-11 PROCEDURE — 83690 ASSAY OF LIPASE: CPT

## 2019-12-11 PROCEDURE — 80053 COMPREHEN METABOLIC PANEL: CPT

## 2019-12-11 PROCEDURE — 85610 PROTHROMBIN TIME: CPT

## 2019-12-11 PROCEDURE — 93306 TTE W/DOPPLER COMPLETE: CPT

## 2019-12-11 PROCEDURE — 83735 ASSAY OF MAGNESIUM: CPT

## 2019-12-11 PROCEDURE — 36415 COLL VENOUS BLD VENIPUNCTURE: CPT

## 2019-12-11 PROCEDURE — 84484 ASSAY OF TROPONIN QUANT: CPT

## 2019-12-11 PROCEDURE — 81001 URINALYSIS AUTO W/SCOPE: CPT

## 2019-12-11 PROCEDURE — 83880 ASSAY OF NATRIURETIC PEPTIDE: CPT

## 2019-12-11 RX ADMIN — PANTOPRAZOLE SODIUM SCH MG: 40 TABLET, DELAYED RELEASE ORAL at 21:37

## 2019-12-11 RX ADMIN — OXYCODONE HYDROCHLORIDE AND ACETAMINOPHEN SCH MG: 500 TABLET ORAL at 21:35

## 2019-12-11 NOTE — PHYS DOC
Past Medical History


Past Medical History:  GERD, High Cholesterol, Other


Additional Past Medical Histor:  AAA, RENAL CYST; prostatitis


Past Surgical History:  Other


Additional Past Surgical Histo:  perforated ulcer repair, hernia repair; cardiac

cath no stents


Alcohol Use:  Rarely


Drug Use:  None





Adult General


Chief Complaint


Chief Complaint:  CHEST PAIN





HPI


HPI





Patient is an 84-year-old male who presents with complaint of intermittent chest

pain that started yesterday. Patient indicates that pain comes and goes and 

states that when it comes it lasts for about 10-20 seconds. He describes pain as

sharp in nature when it comes. He denies any exacerbating or alleviating factor

s. He denies any nausea, vomiting or diaphoresis. He rates pain at a 7 out of 10

when pain is present but states that he has no pain at this time.[]





Review of Systems


Review of Systems





Constitutional: Denies fever or chills []


Respiratory: Denies cough or shortness of breath []


Cardiovascular: No additional information not addressed in HPI []


GI: Denies abdominal pain, nausea, vomiting or diarrhea []


Integument: Denies rash or skin lesions []


Neurologic: Denies headache, focal weakness or sensory changes []





All other systems were reviewed and found to be within normal limits, except as 

documented in this note.





Current Medications


Current Medications





Current Medications








 Medications


  (Trade)  Dose


 Ordered  Sig/Britany  Start Time


 Stop Time Status Last Admin


Dose Admin


 


 Aspirin


  (Children'S


 Aspirin)  324 mg  1X  ONCE  12/11/19 11:15


 12/11/19 11:16 DC 12/11/19 11:15


324 MG


 


 Morphine Sulfate


  (Morphine


 Sulfate)  2 mg  PRN Q2HR  PRN  12/11/19 13:15


 12/12/19 13:14   





 


 Ondansetron HCl


  (Zofran)  4 mg  PRN Q8HRS  PRN  12/11/19 13:15


 12/12/19 13:14   














Allergies


Allergies





Allergies








Coded Allergies Type Severity Reaction Last Updated Verified


 


  No Known Drug Allergies    4/8/19 No











Physical Exam


Physical Exam





Constitutional: Well developed, well nourished, no acute distress, non-toxic 

appearance. []


HENT: Normocephalic, atraumatic, bilateral external ears normal, oropharynx 

moist, no oral exudates, nose normal. []


Eyes: PERRLA, EOMI, conjunctiva normal, no discharge. [] 


Neck: Normal range of motion, no tenderness, supple, no stridor. [] 


Cardiovascular: Regular rate and rhythm[]


Lungs & Thorax:  Bilateral breath sounds clear to auscultation []


Abdomen: Bowel sounds normal, soft, no tenderness. [] 


Skin: Warm, dry, no erythema, no rash. [] 


Extremities: No tenderness, no cyanosis, no clubbing, ROM intact, no edema. [] 


Neurologic: Alert and oriented X 3, no focal deficits noted. []





Current Patient Data


Vital Signs





                                   Vital Signs








  Date Time  Temp Pulse Resp B/P (MAP) Pulse Ox O2 Delivery O2 Flow Rate FiO2


 


12/11/19 11:03 98.0 63 16 142/80 (100) 99 Room Air  





 98.0       








Lab Values





                                Laboratory Tests








Test


 12/11/19


11:15 12/11/19


12:42


 


White Blood Count


 5.8 x10^3/uL


(4.0-11.0) 





 


Red Blood Count


 3.75 x10^6/uL


(4.30-5.70)  L 





 


Hemoglobin


 11.6 g/dL


(13.0-17.5)  L 





 


Hematocrit


 35.0 %


(39.0-53.0)  L 





 


Mean Corpuscular Volume


 93 fL ()


 





 


Mean Corpuscular Hemoglobin 31 pg (25-35)   


 


Mean Corpuscular Hemoglobin


Concent 33 g/dL


(31-37) 





 


Red Cell Distribution Width


 12.9 %


(11.5-14.5) 





 


Platelet Count


 196 x10^3/uL


(140-400) 





 


Neutrophils (%) (Auto) 63 % (31-73)   


 


Lymphocytes (%) (Auto) 23 % (24-48)  L 


 


Monocytes (%) (Auto) 10 % (0-9)  H 


 


Eosinophils (%) (Auto) 3 % (0-3)   


 


Basophils (%) (Auto) 1 % (0-3)   


 


Neutrophils # (Auto)


 3.7 x10^3/uL


(1.8-7.7) 





 


Lymphocytes # (Auto)


 1.3 x10^3/uL


(1.0-4.8) 





 


Monocytes # (Auto)


 0.6 x10^3/uL


(0.0-1.1) 





 


Eosinophils # (Auto)


 0.2 x10^3/uL


(0.0-0.7) 





 


Basophils # (Auto)


 0.1 x10^3/uL


(0.0-0.2) 





 


Prothrombin Time


 12.1 SEC


(11.7-14.0) 





 


Prothrombin Time INR 0.9 (0.8-1.1)   


 


Sodium Level


 141 mmol/L


(136-145) 





 


Potassium Level


 4.3 mmol/L


(3.5-5.1) 





 


Chloride Level


 104 mmol/L


() 





 


Carbon Dioxide Level


 30 mmol/L


(21-32) 





 


Anion Gap 7 (6-14)   


 


Blood Urea Nitrogen


 24 mg/dL


(8-26) 





 


Creatinine


 1.5 mg/dL


(0.7-1.3)  H 





 


Estimated GFR


(Cockcroft-Gault) 44.8  


 





 


BUN/Creatinine Ratio 16 (6-20)   


 


Glucose Level


 104 mg/dL


(70-99)  H 





 


Calcium Level


 9.8 mg/dL


(8.5-10.1) 





 


Magnesium Level


 1.7 mg/dL


(1.8-2.4)  L 





 


Total Bilirubin


 0.4 mg/dL


(0.2-1.0) 





 


Aspartate Amino Transferase


(AST) 18 U/L (15-37)


 





 


Alanine Aminotransferase (ALT)


 21 U/L (16-63)


 





 


Alkaline Phosphatase


 69 U/L


() 





 


Troponin I Quantitative


 < 0.017 ng/mL


(0.000-0.055) 





 


NT-Pro-B-Type Natriuretic


Peptide 293 pg/mL


(0-449) 





 


Total Protein


 7.4 g/dL


(6.4-8.2) 





 


Albumin


 3.9 g/dL


(3.4-5.0) 





 


Albumin/Globulin Ratio 1.1 (1.0-1.7)   


 


Lipase


 173 U/L


() 





 


Urine Collection Type  Unknown  


 


Urine Color  Yellow  


 


Urine Clarity  Clear  


 


Urine pH  7.5  


 


Urine Specific Gravity  <=1.005  


 


Urine Protein


 


 Negative mg/dL


(NEG-TRACE)


 


Urine Glucose (UA)


 


 Negative mg/dL


(NEG)


 


Urine Ketones (Stick)


 


 Negative mg/dL


(NEG)


 


Urine Blood


 


 Negative (NEG)





 


Urine Nitrite


 


 Negative (NEG)





 


Urine Bilirubin


 


 Negative (NEG)





 


Urine Urobilinogen Dipstick


 


 0.2 mg/dL (0.2


mg/dL)


 


Urine Leukocyte Esterase


 


 Negative (NEG)





 


Urine RBC  0 /HPF (0-2)  


 


Urine WBC  0 /HPF (0-4)  


 


Urine Squamous Epithelial


Cells 


 Few /LPF  





 


Urine Bacteria


 


 0 /HPF (0-FEW)








                                Laboratory Tests


12/11/19 11:15








                                Laboratory Tests


12/11/19 11:15














EKG


EKG


[]


Interpretation Time:


EKG demonstrates normal sinus rhythm with rate of 60.





Radiology/Procedures


Radiology/Procedures


[]


Impressions:


PROCEDURE: PORTABLE CHEST 1V





Examination: PORTABLE CHEST 1V


 


History: Chest pain


 


Comparison/Correlation: 6/19/2019 portable chest x-ray exam


 


Findings: Portable upright frontal view of chest was obtained. Heart size 


and pulmonary vessels are normal. No infiltrate or significant pleural 


effusion. Minimal left pleural effusion or pleural thickening noted. No 


pneumothorax. Bony structures are unremarkable.


 


 


Impression:


No infiltrate. Minimal left pleural effusion or pleural thickening. This 


is decreased compared to the prior exam.


 


Electronically signed by: Sen Castro MD (12/11/2019 11:42 AM) [facility]





Course & Med Decision Making


Course & Med Decision Making


Pertinent Labs and Imaging studies reviewed. (See chart for details)





[]





Dragon Disclaimer


Dragon Disclaimer


This electronic medical record was generated, in whole or in part, using a voice

 recognition dictation system.





Departure


Departure


Impression:  


   Primary Impression:  


   Chest pain


Disposition:  09 ADMITTED AS INPATIENT


Admitting Physician:  AURELIO (Dr. Washington)


Condition:  IMPROVED


Referrals:  


FREDRICK RUSSELL MD (PCP)





Problem Qualifiers








   Primary Impression:  


   Chest pain


   Chest pain type:  unspecified  Qualified Codes:  R07.9 - Chest pain, 

   unspecified








ANAHI BARROSO Jr. DO          Dec 11, 2019 11:36

## 2019-12-11 NOTE — EKG
[facility]

              [address] [place] [address]

Test Date:    2019               Test Time:    11:03:23

Pat Name:     JC RAMIREZ            Department:   

Patient ID:   [id number]           Room:          

Gender:       M                        Technician:   

:          [id number]               Requested By: ANAHI BARROSO

Order Number: [id number]           Reading MD:     

                                 Measurements

Intervals                              Axis          

Rate:         60                       P:            -117

GA:           166                      QRS:          26

QRSD:         92                       T:            24

QT:           402                                    

QTc:          402                                    

                           Interpretive Statements

SINUS RHYTHM

INCOMPLETE RIGHT BUNDLE BRANCH BLOCK

OTHERWISE NORMAL ECG

RI6.01

No previous ECG available for comparison

## 2019-12-11 NOTE — RAD
Examination: PORTABLE CHEST 1V

 

History: Chest pain

 

Comparison/Correlation: 6/19/2019 portable chest x-ray exam

 

Findings: Portable upright frontal view of chest was obtained. Heart size 

and pulmonary vessels are normal. No infiltrate or significant pleural 

effusion. Minimal left pleural effusion or pleural thickening noted. No 

pneumothorax. Bony structures are unremarkable.

 

 

Impression:

No infiltrate. Minimal left pleural effusion or pleural thickening. This 

is decreased compared to the prior exam.

 

Electronically signed by: Sen Castro MD (12/11/2019 11:42 AM) [facility]

## 2019-12-11 NOTE — PDOC1
History and Physical


Date of Admission


Date of Admission


DATE: 12/11/19 


TIME: 13:06





Identification/Chief Complaint


Chief Complaint


seen in er with neg troponin i , complaint of intermittent chest pain that 

started yesterday. 





Patient indicates that pain comes and goes and states that when it comes it l

asts for about 10-20 seconds. He describes pain as sharp in nature when it 

comes. 





 denies any exacerbating or alleviating factors. He denies any nausea, vomiting 

or diaphoresis. He rates pain at a 7 out of 10 when pain is present but states 

that he has no pain at this time.





troponin i neg x 3





Past Medical History


Past Medical History


                                                            


 Past Medical History 


Past Medical History


Past Medical History:  GERD, High Cholesterol, Other


Additional Past Medical Histor:  AAA, RENAL CYST; prostatitis


Past Surgical History:  Other


Additional Past Surgical Histo:  perforated ulcer repair, hernia repair; cardiac

cath no stents


Alcohol Use:  Rarely


Drug Use:  None








Past Medical History


Past Medical History:  Diabetes-Type II, High Cholesterol, Hypertension, Other


Additional Past Medical Histor:  AAA 4.7 cm


Past Surgical History:  No Surgical History


Alcohol Use:  None


Drug Use:  None








fhx htn


Cardiovascular:  HTN, Hyperlipidemia, Other


Renal/:  Chronic renal insuff


Endocrine:  Diabetes





Family History


Family History:  Hypertension, Other


Family History:  Parent





Social History


Smoke:  No


ALCOHOL:  rare


Drugs:  None





Current Medications


Current Medications





Current Medications


Aspirin (Children'S Aspirin) 324 mg 1X  ONCE PO  Last administered on 12/11/19at

11:15;  Start 12/11/19 at 11:15;  Stop 12/11/19 at 11:16;  Status DC





Active Scripts


Active


Hydrocodone-Apap 5-325  ** (Hydrocodone Bit/Acetaminophen) 1 Tab Tablet 1 Tab PO

PRN Q4HRS PRN 30 Days


[Pantoprazole] 40 MG Tablet. 40 Mg PO BIDAC


Reported


Calcium Carbonate 300 Mg Tab.chew 600 Mg PO DAILY


Krill Oil 500 Mg Capsule 500 Mg PO DAILY


Vitamin D3 (Cholecalciferol (Vitamin D3)) 1,000 Unit Tablet 2,000 Unit PO DAILY


Vitamin C (Ascorbic Acid) 1,000 Mg Tablet 1,000 Mg PO BID


Aspir-Low (Aspirin) 81 Mg Tablet. 81 Mg PO DAILY


Multi-Vitamin Daily (Multivitamin) 1 Each Tablet 1 Each PO DAILY


Finasteride 5 Mg Tablet 1 Tab PO HS


Flomax (Tamsulosin Hcl) 0.4 Mg Cap.er.24h 1 Cap PO HS


Simvastatin 20 Mg Tablet 1 Tab PO DAILY





Allergies


Allergies:  


Coded Allergies:  


     No Known Drug Allergies (Unverified , 4/8/19)





ROS


Review of System





Review of Systems


Review of Systems





Constitutional: Denies fever or chills []


Respiratory: Denies cough or shortness of breath []


Cardiovascular: No additional information not addressed in HPI []


GI: Denies abdominal pain, nausea, vomiting or diarrhea []


Integument: Denies rash or skin lesions []


Neurologic: Denies headache, focal weakness or sensory changes []





14 pt  systems were reviewed and found to be within normal limits, except as 

documented


General:  No: Chills, Night Sweats, Fatigue, Malaise, Appetite, Other


PSYCHOLOGICAL ROS:  No: Anxiety, Behavioral Disorder, Concentration difficultie,

 Decreased libido, Depression, Disorientation, Hallucinations, Hostility, 

Irritablity, Memory difficulties, Mood Swings, Obsessive thoughts, Physical 

abuse, Sexual abuse, Sleep disturbances, Suicidal ideation, Other


ALLERGY AND IMMUNOLOGY:  No: Hives, Insect Bite Sensitivity, Itchy/Watery Eyes, 

Nasal Congestion, Post Nasal Drip, Seasonal Allergies, Other


Hematological and Lymphatic:  No: Bleeding Problems, Blood Clots, Blood 

Transfusions, Brusing, Night Sweats, Pallor, Swollen Lymph Nodes, Other


Respiratory:  No: Cough, Hemoptysis, Orthopnea, Pleuritic Pain, Shortness of 

breath, SOB with excertion, Sputum Changes, Stridor, Tachypnea, Wheezing, Other


Cardiovascular:  yes Chest Pain; 


   No Palpitations, No Orthopnea, No Paroxysmal Noc. Dyspnea, No Edema, No Lt 

Headedness, No Other


Gastrointestinal:  No Nausea, No Vomiting, No Abdominal Pain, No Diarrhea, No 

Constipation, No Melena, No Hematochezia, No Other


Musculoskeletal:  Yes Joint Stiffness


Neurological:  No Behavorial Changes, No Bowel/Bladder ControlChng, No 

Confusion, No Dizziness, No Gait Disturbance, No Headaches, No Impaired Coor

d/balance, No Memory Loss, No Numbness/Tingling, No Seizures, No Speech 

Problems, No Tremors, No Visual Changes, No Weakness, No Other





Physical Exam


Physical Exam





Physical Exam


Physical Exam





Constitutional: Well developed, well nourished, no acute distress, non-toxic 

appearance. []


HENT: Normocephalic, atraumatic, bilateral external ears normal, oropharynx 

moist, no oral exudates, nose normal. []


Eyes: PERRLA, EOMI, conjunctiva normal, no discharge. [] 


Neck: Normal range of motion, no tenderness, supple, no stridor. [] 


Cardiovascular: Regular rate and rhythm[]


Lungs & Thorax:  Bilateral breath sounds clear to auscultation []


Abdomen: Bowel sounds normal, soft, no tenderness. [] 


Skin: Warm, dry, no erythema, no rash. [] 


Extremities: No tenderness, no cyanosis, no clubbing, ROM intact, no edema. [] 


Neurologic: Alert and oriented X 3, no focal deficits noted. []


General:  Alert, Oriented X3, Cooperative, No acute distress


HEENT:  Mucous membr. moist/pink


Lungs:  Clear to auscultation, Normal air movement


Heart:  RRR, no gallops


Abdomen:  Normal bowel sounds, Soft


Rectal Exam:  not examined


PELVIC:  Examination not indicated


Neuro:  Normal speech, Cranial nerves 3-12 NL


Psych/Mental Status:  Mental status NL, Mood NL





Vitals


Vitals





Vital Signs








  Date Time  Temp Pulse Resp B/P (MAP) Pulse Ox O2 Delivery O2 Flow Rate FiO2


 


12/11/19 11:03 98.0 63 16 142/80 (100) 99 Room Air  





 98.0       











Labs


Labs





Laboratory Tests








Test


 12/11/19


11:15


 


White Blood Count


 5.8 x10^3/uL


(4.0-11.0)


 


Red Blood Count


 3.75 x10^6/uL


(4.30-5.70)


 


Hemoglobin


 11.6 g/dL


(13.0-17.5)


 


Hematocrit


 35.0 %


(39.0-53.0)


 


Mean Corpuscular Volume 93 fL () 


 


Mean Corpuscular Hemoglobin 31 pg (25-35) 


 


Mean Corpuscular Hemoglobin


Concent 33 g/dL


(31-37)


 


Red Cell Distribution Width


 12.9 %


(11.5-14.5)


 


Platelet Count


 196 x10^3/uL


(140-400)


 


Neutrophils (%) (Auto) 63 % (31-73) 


 


Lymphocytes (%) (Auto) 23 % (24-48) 


 


Monocytes (%) (Auto) 10 % (0-9) 


 


Eosinophils (%) (Auto) 3 % (0-3) 


 


Basophils (%) (Auto) 1 % (0-3) 


 


Neutrophils # (Auto)


 3.7 x10^3/uL


(1.8-7.7)


 


Lymphocytes # (Auto)


 1.3 x10^3/uL


(1.0-4.8)


 


Monocytes # (Auto)


 0.6 x10^3/uL


(0.0-1.1)


 


Eosinophils # (Auto)


 0.2 x10^3/uL


(0.0-0.7)


 


Basophils # (Auto)


 0.1 x10^3/uL


(0.0-0.2)


 


Prothrombin Time


 12.1 SEC


(11.7-14.0)


 


Prothromb Time International


Ratio 0.9 (0.8-1.1) 





 


Sodium Level


 141 mmol/L


(136-145)


 


Potassium Level


 4.3 mmol/L


(3.5-5.1)


 


Chloride Level


 104 mmol/L


()


 


Carbon Dioxide Level


 30 mmol/L


(21-32)


 


Anion Gap 7 (6-14) 


 


Blood Urea Nitrogen


 24 mg/dL


(8-26)


 


Creatinine


 1.5 mg/dL


(0.7-1.3)


 


Estimated GFR


(Cockcroft-Gault) 44.8 





 


BUN/Creatinine Ratio 16 (6-20) 


 


Glucose Level


 104 mg/dL


(70-99)


 


Calcium Level


 9.8 mg/dL


(8.5-10.1)


 


Magnesium Level


 1.7 mg/dL


(1.8-2.4)


 


Total Bilirubin


 0.4 mg/dL


(0.2-1.0)


 


Aspartate Amino Transf


(AST/SGOT) 18 U/L (15-37) 





 


Alanine Aminotransferase


(ALT/SGPT) 21 U/L (16-63) 





 


Alkaline Phosphatase


 69 U/L


()


 


Troponin I Quantitative


 < 0.017 ng/mL


(0.000-0.055)


 


NT-Pro-B-Type Natriuretic


Peptide 293 pg/mL


(0-449)


 


Total Protein


 7.4 g/dL


(6.4-8.2)


 


Albumin


 3.9 g/dL


(3.4-5.0)


 


Albumin/Globulin Ratio 1.1 (1.0-1.7) 


 


Lipase


 173 U/L


()








Laboratory Tests








Test


 12/11/19


11:15


 


White Blood Count


 5.8 x10^3/uL


(4.0-11.0)


 


Red Blood Count


 3.75 x10^6/uL


(4.30-5.70)


 


Hemoglobin


 11.6 g/dL


(13.0-17.5)


 


Hematocrit


 35.0 %


(39.0-53.0)


 


Mean Corpuscular Volume 93 fL () 


 


Mean Corpuscular Hemoglobin 31 pg (25-35) 


 


Mean Corpuscular Hemoglobin


Concent 33 g/dL


(31-37)


 


Red Cell Distribution Width


 12.9 %


(11.5-14.5)


 


Platelet Count


 196 x10^3/uL


(140-400)


 


Neutrophils (%) (Auto) 63 % (31-73) 


 


Lymphocytes (%) (Auto) 23 % (24-48) 


 


Monocytes (%) (Auto) 10 % (0-9) 


 


Eosinophils (%) (Auto) 3 % (0-3) 


 


Basophils (%) (Auto) 1 % (0-3) 


 


Neutrophils # (Auto)


 3.7 x10^3/uL


(1.8-7.7)


 


Lymphocytes # (Auto)


 1.3 x10^3/uL


(1.0-4.8)


 


Monocytes # (Auto)


 0.6 x10^3/uL


(0.0-1.1)


 


Eosinophils # (Auto)


 0.2 x10^3/uL


(0.0-0.7)


 


Basophils # (Auto)


 0.1 x10^3/uL


(0.0-0.2)


 


Prothrombin Time


 12.1 SEC


(11.7-14.0)


 


Prothromb Time International


Ratio 0.9 (0.8-1.1) 





 


Sodium Level


 141 mmol/L


(136-145)


 


Potassium Level


 4.3 mmol/L


(3.5-5.1)


 


Chloride Level


 104 mmol/L


()


 


Carbon Dioxide Level


 30 mmol/L


(21-32)


 


Anion Gap 7 (6-14) 


 


Blood Urea Nitrogen


 24 mg/dL


(8-26)


 


Creatinine


 1.5 mg/dL


(0.7-1.3)


 


Estimated GFR


(Cockcroft-Gault) 44.8 





 


BUN/Creatinine Ratio 16 (6-20) 


 


Glucose Level


 104 mg/dL


(70-99)


 


Calcium Level


 9.8 mg/dL


(8.5-10.1)


 


Magnesium Level


 1.7 mg/dL


(1.8-2.4)


 


Total Bilirubin


 0.4 mg/dL


(0.2-1.0)


 


Aspartate Amino Transf


(AST/SGOT) 18 U/L (15-37) 





 


Alanine Aminotransferase


(ALT/SGPT) 21 U/L (16-63) 





 


Alkaline Phosphatase


 69 U/L


()


 


Troponin I Quantitative


 < 0.017 ng/mL


(0.000-0.055)


 


NT-Pro-B-Type Natriuretic


Peptide 293 pg/mL


(0-449)


 


Total Protein


 7.4 g/dL


(6.4-8.2)


 


Albumin


 3.9 g/dL


(3.4-5.0)


 


Albumin/Globulin Ratio 1.1 (1.0-1.7) 


 


Lipase


 173 U/L


()











Images


Images





Examination: PORTABLE CHEST 1V


 


History: Chest pain


 


Comparison/Correlation: 6/19/2019 portable chest x-ray exam


 


Findings: Portable upright frontal view of chest was obtained. Heart size 


and pulmonary vessels are normal. No infiltrate or significant pleural 


effusion. Minimal left pleural effusion or pleural thickening noted. No 


pneumothorax. Bony structures are unremarkable.


 


 


Impression:


No infiltrate. Minimal left pleural effusion or pleural thickening. This 


is decreased compared to the prior exam.


 


Electronically signed by: Nilo Zacarias MD (12/11/2019 11:42 AM) [facility]














DICTATED and SIGNED BY:     NILO ZACARIAS MD


DATE:     12/11/19 1142





VTE Prophylaxis Ordered


VTE Prophylaxis Devices:  No


VTE Pharmacological Prophylaxi:  Yes





Assessment/Plan


Assessment/Plan


Impression:











chest pain


Minimal left pleural effusion or pleural thickening. This is decreased compared 

to the prior exam. by cxr 12/;11


hx bph


hx hyperlipidemia


hx mild hyperglycemia, monitor








plan





admit


cvc bed


serial troponin i


echo


cardiology consult


dvt prophylaxis











ANTHONY YUEN MD          Dec 11, 2019 13:06

## 2019-12-12 VITALS — DIASTOLIC BLOOD PRESSURE: 71 MMHG | SYSTOLIC BLOOD PRESSURE: 107 MMHG

## 2019-12-12 VITALS
SYSTOLIC BLOOD PRESSURE: 135 MMHG | SYSTOLIC BLOOD PRESSURE: 135 MMHG | DIASTOLIC BLOOD PRESSURE: 76 MMHG | DIASTOLIC BLOOD PRESSURE: 76 MMHG | SYSTOLIC BLOOD PRESSURE: 135 MMHG | DIASTOLIC BLOOD PRESSURE: 76 MMHG | DIASTOLIC BLOOD PRESSURE: 76 MMHG | DIASTOLIC BLOOD PRESSURE: 76 MMHG | SYSTOLIC BLOOD PRESSURE: 135 MMHG | SYSTOLIC BLOOD PRESSURE: 135 MMHG | DIASTOLIC BLOOD PRESSURE: 76 MMHG | SYSTOLIC BLOOD PRESSURE: 135 MMHG

## 2019-12-12 VITALS — DIASTOLIC BLOOD PRESSURE: 75 MMHG | SYSTOLIC BLOOD PRESSURE: 115 MMHG

## 2019-12-12 VITALS — DIASTOLIC BLOOD PRESSURE: 74 MMHG | SYSTOLIC BLOOD PRESSURE: 117 MMHG

## 2019-12-12 LAB
CHOLEST SERPL-MCNC: 126 MG/DL (ref 0–200)
CHOLEST/HDLC SERPL: 1.9 {RATIO}
HDLC SERPL-MCNC: 65 MG/DL (ref 40–60)
LDLC: 55 MG/DL (ref 0–100)
TRIGL SERPL-MCNC: 32 MG/DL (ref 0–150)
VLDLC: 6 MG/DL (ref 0–40)

## 2019-12-12 RX ADMIN — OXYCODONE HYDROCHLORIDE AND ACETAMINOPHEN SCH MG: 500 TABLET ORAL at 13:08

## 2019-12-12 RX ADMIN — PANTOPRAZOLE SODIUM SCH MG: 40 TABLET, DELAYED RELEASE ORAL at 18:08

## 2019-12-12 RX ADMIN — PANTOPRAZOLE SODIUM SCH MG: 40 TABLET, DELAYED RELEASE ORAL at 13:08

## 2019-12-12 NOTE — PDOC3
Discharge Summary


Visit Information


Date of Admission:  Dec 11, 2019


Date of Discharge:  Dec 12, 2019


Admitting Diagnosis:  Chest pain


Final Diagnosis


Hypertensive urgency, chest pain





Brief Hospital Course


Allergies





                                    Allergies








Coded Allergies Type Severity Reaction Last Updated Verified


 


  No Known Drug Allergies    4/8/19 No








Vital Signs





Vital Signs








  Date Time  Temp Pulse Resp B/P (MAP) Pulse Ox O2 Delivery O2 Flow Rate FiO2


 


12/12/19 14:52 97.9 73 20 135/76 (95) 96 Room Air  





 97.9       








Lab Results





Laboratory Tests








Test


 12/11/19


11:15 12/11/19


12:42 12/11/19


16:05 12/11/19


18:55


 


White Blood Count


 5.8 x10^3/uL


(4.0-11.0) 


 


 





 


Red Blood Count


 3.75 x10^6/uL


(4.30-5.70) 


 


 





 


Hemoglobin


 11.6 g/dL


(13.0-17.5) 


 


 





 


Hematocrit


 35.0 %


(39.0-53.0) 


 


 





 


Mean Corpuscular Volume 93 fL ()    


 


Mean Corpuscular Hemoglobin 31 pg (25-35)    


 


Mean Corpuscular Hemoglobin


Concent 33 g/dL


(31-37) 


 


 





 


Red Cell Distribution Width


 12.9 %


(11.5-14.5) 


 


 





 


Platelet Count


 196 x10^3/uL


(140-400) 


 


 





 


Neutrophils (%) (Auto) 63 % (31-73)    


 


Lymphocytes (%) (Auto) 23 % (24-48)    


 


Monocytes (%) (Auto) 10 % (0-9)    


 


Eosinophils (%) (Auto) 3 % (0-3)    


 


Basophils (%) (Auto) 1 % (0-3)    


 


Neutrophils # (Auto)


 3.7 x10^3/uL


(1.8-7.7) 


 


 





 


Lymphocytes # (Auto)


 1.3 x10^3/uL


(1.0-4.8) 


 


 





 


Monocytes # (Auto)


 0.6 x10^3/uL


(0.0-1.1) 


 


 





 


Eosinophils # (Auto)


 0.2 x10^3/uL


(0.0-0.7) 


 


 





 


Basophils # (Auto)


 0.1 x10^3/uL


(0.0-0.2) 


 


 





 


Prothrombin Time


 12.1 SEC


(11.7-14.0) 


 


 





 


Prothromb Time International


Ratio 0.9 (0.8-1.1) 


 


 


 





 


Sodium Level


 141 mmol/L


(136-145) 


 


 





 


Potassium Level


 4.3 mmol/L


(3.5-5.1) 


 


 





 


Chloride Level


 104 mmol/L


() 


 


 





 


Carbon Dioxide Level


 30 mmol/L


(21-32) 


 


 





 


Anion Gap 7 (6-14)    


 


Blood Urea Nitrogen


 24 mg/dL


(8-26) 


 


 





 


Creatinine


 1.5 mg/dL


(0.7-1.3) 


 


 





 


Estimated GFR


(Cockcroft-Gault) 44.8 


 


 


 





 


BUN/Creatinine Ratio 16 (6-20)    


 


Glucose Level


 104 mg/dL


(70-99) 


 


 





 


Calcium Level


 9.8 mg/dL


(8.5-10.1) 


 


 





 


Magnesium Level


 1.7 mg/dL


(1.8-2.4) 


 


 





 


Total Bilirubin


 0.4 mg/dL


(0.2-1.0) 


 


 





 


Aspartate Amino Transf


(AST/SGOT) 18 U/L (15-37) 


 


 


 





 


Alanine Aminotransferase


(ALT/SGPT) 21 U/L (16-63) 


 


 


 





 


Alkaline Phosphatase


 69 U/L


() 


 


 





 


Troponin I Quantitative


 < 0.017 ng/mL


(0.000-0.055) 


 < 0.017 ng/mL


(0.000-0.055) < 0.017 ng/mL


(0.000-0.055)


 


NT-Pro-B-Type Natriuretic


Peptide 293 pg/mL


(0-449) 


 


 





 


Total Protein


 7.4 g/dL


(6.4-8.2) 


 


 





 


Albumin


 3.9 g/dL


(3.4-5.0) 


 


 





 


Albumin/Globulin Ratio 1.1 (1.0-1.7)    


 


Lipase


 173 U/L


() 


 


 





 


Urine Collection Type  Unknown   


 


Urine Color  Yellow   


 


Urine Clarity  Clear   


 


Urine pH  7.5   


 


Urine Specific Gravity  <=1.005   


 


Urine Protein


 


 Negative mg/dL


(NEG-TRACE) 


 





 


Urine Glucose (UA)


 


 Negative mg/dL


(NEG) 


 





 


Urine Ketones (Stick)


 


 Negative mg/dL


(NEG) 


 





 


Urine Blood  Negative (NEG)   


 


Urine Nitrite  Negative (NEG)   


 


Urine Bilirubin  Negative (NEG)   


 


Urine Urobilinogen Dipstick


 


 0.2 mg/dL (0.2


mg/dL) 


 





 


Urine Leukocyte Esterase  Negative (NEG)   


 


Urine RBC  0 /HPF (0-2)   


 


Urine WBC  0 /HPF (0-4)   


 


Urine Squamous Epithelial


Cells 


 Few /LPF 


 


 





 


Urine Bacteria  0 /HPF (0-FEW)   


 


Test


 12/12/19


10:10 


 


 





 


Triglycerides Level


 32 mg/dL


(0-150) 


 


 





 


Cholesterol Level


 126 mg/dL


(0-200) 


 


 





 


LDL Cholesterol, Calculated


 55 mg/dL


(0-100) 


 


 





 


VLDL Cholesterol, Calculated 6 mg/dL (0-40)    


 


Non-HDL Cholesterol Calculated


 61 mg/dL


(0-129) 


 


 





 


HDL Cholesterol


 65 mg/dL


(40-60) 


 


 





 


Cholesterol/HDL Ratio 1.9    


 


Thyroid Stimulating Hormone


(TSH) 1.366 uIU/mL


(0.358-3.74) 


 


 











Laboratory Tests








Test


 12/11/19


18:55 12/12/19


10:10


 


Troponin I Quantitative


 < 0.017 ng/mL


(0.000-0.055) 





 


Triglycerides Level


 


 32 mg/dL


(0-150)


 


Cholesterol Level


 


 126 mg/dL


(0-200)


 


LDL Cholesterol, Calculated


 


 55 mg/dL


(0-100)


 


VLDL Cholesterol, Calculated  6 mg/dL (0-40) 


 


Non-HDL Cholesterol Calculated


 


 61 mg/dL


(0-129)


 


HDL Cholesterol


 


 65 mg/dL


(40-60)


 


Cholesterol/HDL Ratio  1.9 


 


Thyroid Stimulating Hormone


(TSH) 


 1.366 uIU/mL


(0.358-3.74)








Brief Hospital Course


Mr Garg is an 85 yo M w/ PMHx GERD, AAA, HLD, PUD with prior perforation 

complaint of intermittent chest pain that started on 12/10/19.


Patient indicates that pain comes and goes and states that when it comes it 

lasts for about 10-20 seconds. He describes pain as sharp in nature when it 

comes. 


Denies any exacerbating or alleviating factors. He denies any nausea, vomiting 

or diaphoresis. He rates pain at a 7 out of 10 when pain is present but states 

that he has no pain at this time.


Troponin i neg x 3. Seen by cardiology.





He is feeling improved. No SOB





Echo:


The left ventricular systolic function is normal.


The Ejection Fraction is 55-60%.


There is normal LV segmental wall motion.


Transmitral Doppler flow pattern is Grade II-pseudonormal filling dynamics.


Trace tricuspid regurgitation with an estimated PAP of 26 mmHg.


There is no evidence of significant pericardial effusion.





Problem list:


Atypical chest pain


Anemia


HTN


HLP


DM2


Hx of AAA


PUD





Greater than 30 minutes spent on d/c





Discharge Information


Condition at Discharge:  Improved


Follow Up:  Weeks (1)


Disposition/Orders:  D/C to Home


Scheduled


Ascorbic Acid (Vitamin C) 1,000 Mg Tablet, 1,000 MG PO BID for SUPPLEMENT, 

(Reported)


   Entered as Reported by: ELOY FREEDMAN on 4/8/19 0828


   Last Action: Converted on 12/11/19 2051 by ANTHONY DYKES


Aspirin (Aspir-Low) 81 Mg Tablet.dr, 81 MG PO DAILY for UNKNOWN, (Reported)


   Entered as Reported by: ELOY FREEDMAN on 4/8/19 0828


   Last Action: Continued on 12/11/19 2051 by ANTHONY DYKES


Calcium Carbonate (Calcium Carbonate) 300 Mg Tab.chew, 600 MG PO DAILY for 

UNKNOWN, (Reported)


   Entered as Reported by: ELOY FREEDMAN on 4/8/19 0830


   Last Action: Converted on 12/11/19 2051 by ANTHONY DYKES


Cholecalciferol (Vitamin D3) (Vitamin D3) 1,000 Unit Tablet, 2,000 UNIT PO DAILY

for SUPPLEMENT, (Reported)


   Entered as Reported by: EOLY FREEDMAN on 4/8/19 0829


   Last Action: Continued on 12/11/19 2051 by ANTHONY DYKES


Finasteride (Finasteride) 5 Mg Tablet, 1 TAB PO HS, #30 Ref 11 (Reported)


   Entered as Reported by: ERICKA IVERSON on 8/16/18 2252


   Last Action: Continued on 12/11/19 2051 by ANTHONY DYKES


Krill Oil (Krill Oil) 500 Mg Capsule, 500 MG PO DAILY for SUPPLEMENT, (Reported)


   Entered as Reported by: ELOY FREEDMAN on 4/8/19 0830


   Last Action: Converted on 12/11/19 2051 by ANTHONY DYKES


Multivitamin (Multi-Vitamin Daily) 1 Each Tablet, 1 EACH PO DAILY, (Reported)


   Entered as Reported by: ERICKA IVERSON on 8/16/18 2300


   Last Action: Converted on 12/11/19 2051 by ANTHONY DYKES


Simvastatin (Simvastatin) 20 Mg Tablet, 1 TAB PO DAILY, #30 Ref 5 (Reported)


   Entered as Reported by: MENDEZ NATION on 12/23/15 1557


   Last Action: Continued on 12/11/19 2051 by ANTHONY DYKES


Tamsulosin Hcl (Flomax) 0.4 Mg Cap.er.24h, 1 CAP PO HS, #30 Ref 11 (Reported)


   Entered as Reported by: ERICKA IVERSON on 8/16/18 2252


   Last Action: Continued on 12/11/19 2051 by ANTHONY DYKES


[Pantoprazole] 40 MG TABLET., 40 MG PO BIDAC, #60 Ref 0


   Prescribed by: Roxy Schaeffer on 6/22/18 0848


   Last Action: Converted on 12/11/19 2051 by TILA STEIN MD        Dec 12, 2019 17:22

## 2019-12-12 NOTE — CARD
MR#: [id number]

Account#: [id number]

Accession#: [id number]

Date of Study: 12/12/2019

Ordering Physician: ANTHONY YUEN, 

Referring Physician: ANTHONY YUEN, 

Tech: Jennifer Siddiqui





--------------- APPROVED REPORT --------------





EXAM: Two-dimensional and M-mode echocardiogram with Doppler and color Doppler.



Other Information 

Quality : AverageHR: 59bpm



INDICATION

Chest Pain 



2D DIMENSIONS 

RVDd3.7 (2.9-3.5cm)Left Atrium(2D)3.8 (1.6-4.0cm)

IVSd1.1 (0.7-1.1cm)Aortic Root(2D)3.3 (2.0-3.7cm)

LVDd4.8 (3.9-5.9cm)LVOT Diameter2.2 (1.8-2.4cm)

PWd1.1 (0.7-1.1cm)LVDs3.4 (2.5-4.0cm)

FS (%) 29.6 %SV60.2 ml

LVEF(%)56.5 (>50%)



Aortic Valve

AoV Peak Kenji.142.4cm/sAoV VTI26.6cm

AO Peak GR.8.1mmHgLVOT Peak Kenji.124.1cm/s

LVOT  VTI 24.44cmAO Mean GR.4mmHg

MELVIN (VMAX)2.24hv7RQG   (VTI)3.54cm2



Mitral Valve

MV E Migusbtg61.4cm/sMV DECEL [id number]

MV A Kniidvwe73.2cm/sMV VLM68ok

E/A  Ratio0.9MVA (PHT)2.50cm2



TDI

E/Lateral E'4.9E/Medial E'6.9



Pulmonary Valve

PV Peak Fwqwckhr18.8cm/sPV Peak Grad.4mmHg



Tricuspid Valve

TR P. Vhokfylm599gt/sRAP [id number]

TR Peak Gr.54yvVfHCYH46vhZh



Pulmonary Vein

S1 Gjgquubk61.1cm/sD2 Bryepwox83.9cm/s

PVa [id number]



 LEFT VENTRICLE 

The left ventricle is normal size. There is mild concentric left ventricular hypertrophy. The left ve
ntricular systolic function is normal. The Ejection Fraction is 55-60%. There is normal LV segmental 
wall motion. Transmitral Doppler flow pattern is Grade II-pseudonormal filling dynamics.



 RIGHT VENTRICLE 

The right ventricle is normal size. There is normal right ventricular wall thickness. The right ventr
icular systolic function is normal.



 ATRIA 

The left atrium size is normal. The right atrium is borderline dilated. The interatrial septum is int
act with no evidence for an atrial septal defect or patent foramen ovale as noted on 2-D or Doppler i
maging.



 AORTIC VALVE 

The aortic valve is calcified but opens well. Doppler and Color Flow revealed no significant aortic r
egurgitation. There is no significant aortic valvular stenosis.



 MITRAL VALVE 

The mitral valve is normal in structure and function. There is no evidence of mitral valve prolapse. 
There is no mitral valve stenosis. Doppler and Color Flow revealed no mitral valve regurgitation note
d.



 TRICUSPID VALVE 

The tricuspid valve is normal in structure and function. Doppler and Color Flow revealed trace tricus
pid regurgitation with an estimated PAP of 26 mmHg. There is no tricuspid valve stenosis.



 PULMONIC VALVE 

The pulmonic valve is not well visualized. Doppler and Color Flow revealed trace pulmonic valvular re
gurgitation.



 GREAT VESSELS 

The aortic root is normal in size. The IVC is normal in size and collapses >50% with inspiration.



 PERICARDIAL EFFUSION 

There is no evidence of significant pericardial effusion.



Critical Notification

Critical Value: No



<Conclusion>

The left ventricular systolic function is normal.

The Ejection Fraction is 55-60%.

There is normal LV segmental wall motion.

Transmitral Doppler flow pattern is Grade II-pseudonormal filling dynamics.

Trace tricuspid regurgitation with an estimated PAP of 26 mmHg.

There is no evidence of significant pericardial effusion.



Signed by : Freeman Mccloud, 

Electronically Approved : 12/12/2019 16:06:51
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
## 2019-12-12 NOTE — PDOC
PROGRESS NOTES


Chief Complaint


Chief Complaint


Atypical chest pain


Anemia


HTN


HLP


DM2


Hx of AAA


PUD





History of Present Illness


History of Present Illness


Mr Garg is an 83 yo M w/ PMHx GERD, AAA, HLD, PUD with prior perforation 

complaint of intermittent chest pain that started on 12/10/19.


Patient indicates that pain comes and goes and states that when it comes it 

lasts for about 10-20 seconds. He describes pain as sharp in nature when it 

comes. 


Denies any exacerbating or alleviating factors. He denies any nausea, vomiting 

or diaphoresis. He rates pain at a 7 out of 10 when pain is present but states 

that he has no pain at this time.


Troponin i neg x 3. Seen by cardiology.





He is feeling improved. Awaiting echocardiogram currently. No SOB





Vitals


Vitals





Vital Signs








  Date Time  Temp Pulse Resp B/P (MAP) Pulse Ox O2 Delivery O2 Flow Rate FiO2


 


12/12/19 11:39 97.8 89 20 115/75 (88) 92 Room Air  





 97.8       











Physical Exam


General:  Alert, Oriented X3, Cooperative, No acute distress


Lungs:  Clear


Abdomen:  Normal bowel sounds, Soft





Labs


LABS





Laboratory Tests








Test


 12/11/19


16:05 12/11/19


18:55 12/12/19


10:10


 


Troponin I Quantitative


 < 0.017 ng/mL


(0.000-0.055) < 0.017 ng/mL


(0.000-0.055) 





 


Triglycerides Level


 


 


 32 mg/dL


(0-150)


 


Cholesterol Level


 


 


 126 mg/dL


(0-200)


 


LDL Cholesterol, Calculated


 


 


 55 mg/dL


(0-100)


 


VLDL Cholesterol, Calculated   6 mg/dL (0-40) 


 


Non-HDL Cholesterol Calculated


 


 


 61 mg/dL


(0-129)


 


HDL Cholesterol


 


 


 65 mg/dL


(40-60)


 


Cholesterol/HDL Ratio   1.9 


 


Thyroid Stimulating Hormone


(TSH) 


 


 1.366 uIU/mL


(0.358-3.74)











Comment


Review of Relevant


I have reviewed the following items ca (where applicable) has been applied.


Labs





Laboratory Tests








Test


 12/11/19


11:15 12/11/19


12:42 12/11/19


16:05 12/11/19


18:55


 


White Blood Count


 5.8 x10^3/uL


(4.0-11.0) 


 


 





 


Red Blood Count


 3.75 x10^6/uL


(4.30-5.70) 


 


 





 


Hemoglobin


 11.6 g/dL


(13.0-17.5) 


 


 





 


Hematocrit


 35.0 %


(39.0-53.0) 


 


 





 


Mean Corpuscular Volume 93 fL ()    


 


Mean Corpuscular Hemoglobin 31 pg (25-35)    


 


Mean Corpuscular Hemoglobin


Concent 33 g/dL


(31-37) 


 


 





 


Red Cell Distribution Width


 12.9 %


(11.5-14.5) 


 


 





 


Platelet Count


 196 x10^3/uL


(140-400) 


 


 





 


Neutrophils (%) (Auto) 63 % (31-73)    


 


Lymphocytes (%) (Auto) 23 % (24-48)    


 


Monocytes (%) (Auto) 10 % (0-9)    


 


Eosinophils (%) (Auto) 3 % (0-3)    


 


Basophils (%) (Auto) 1 % (0-3)    


 


Neutrophils # (Auto)


 3.7 x10^3/uL


(1.8-7.7) 


 


 





 


Lymphocytes # (Auto)


 1.3 x10^3/uL


(1.0-4.8) 


 


 





 


Monocytes # (Auto)


 0.6 x10^3/uL


(0.0-1.1) 


 


 





 


Eosinophils # (Auto)


 0.2 x10^3/uL


(0.0-0.7) 


 


 





 


Basophils # (Auto)


 0.1 x10^3/uL


(0.0-0.2) 


 


 





 


Prothrombin Time


 12.1 SEC


(11.7-14.0) 


 


 





 


Prothromb Time International


Ratio 0.9 (0.8-1.1) 


 


 


 





 


Sodium Level


 141 mmol/L


(136-145) 


 


 





 


Potassium Level


 4.3 mmol/L


(3.5-5.1) 


 


 





 


Chloride Level


 104 mmol/L


() 


 


 





 


Carbon Dioxide Level


 30 mmol/L


(21-32) 


 


 





 


Anion Gap 7 (6-14)    


 


Blood Urea Nitrogen


 24 mg/dL


(8-26) 


 


 





 


Creatinine


 1.5 mg/dL


(0.7-1.3) 


 


 





 


Estimated GFR


(Cockcroft-Gault) 44.8 


 


 


 





 


BUN/Creatinine Ratio 16 (6-20)    


 


Glucose Level


 104 mg/dL


(70-99) 


 


 





 


Calcium Level


 9.8 mg/dL


(8.5-10.1) 


 


 





 


Magnesium Level


 1.7 mg/dL


(1.8-2.4) 


 


 





 


Total Bilirubin


 0.4 mg/dL


(0.2-1.0) 


 


 





 


Aspartate Amino Transf


(AST/SGOT) 18 U/L (15-37) 


 


 


 





 


Alanine Aminotransferase


(ALT/SGPT) 21 U/L (16-63) 


 


 


 





 


Alkaline Phosphatase


 69 U/L


() 


 


 





 


Troponin I Quantitative


 < 0.017 ng/mL


(0.000-0.055) 


 < 0.017 ng/mL


(0.000-0.055) < 0.017 ng/mL


(0.000-0.055)


 


NT-Pro-B-Type Natriuretic


Peptide 293 pg/mL


(0-449) 


 


 





 


Total Protein


 7.4 g/dL


(6.4-8.2) 


 


 





 


Albumin


 3.9 g/dL


(3.4-5.0) 


 


 





 


Albumin/Globulin Ratio 1.1 (1.0-1.7)    


 


Lipase


 173 U/L


() 


 


 





 


Urine Collection Type  Unknown   


 


Urine Color  Yellow   


 


Urine Clarity  Clear   


 


Urine pH  7.5   


 


Urine Specific Gravity  <=1.005   


 


Urine Protein


 


 Negative mg/dL


(NEG-TRACE) 


 





 


Urine Glucose (UA)


 


 Negative mg/dL


(NEG) 


 





 


Urine Ketones (Stick)


 


 Negative mg/dL


(NEG) 


 





 


Urine Blood  Negative (NEG)   


 


Urine Nitrite  Negative (NEG)   


 


Urine Bilirubin  Negative (NEG)   


 


Urine Urobilinogen Dipstick


 


 0.2 mg/dL (0.2


mg/dL) 


 





 


Urine Leukocyte Esterase  Negative (NEG)   


 


Urine RBC  0 /HPF (0-2)   


 


Urine WBC  0 /HPF (0-4)   


 


Urine Squamous Epithelial


Cells 


 Few /LPF 


 


 





 


Urine Bacteria  0 /HPF (0-FEW)   


 


Test


 12/12/19


10:10 


 


 





 


Triglycerides Level


 32 mg/dL


(0-150) 


 


 





 


Cholesterol Level


 126 mg/dL


(0-200) 


 


 





 


LDL Cholesterol, Calculated


 55 mg/dL


(0-100) 


 


 





 


VLDL Cholesterol, Calculated 6 mg/dL (0-40)    


 


Non-HDL Cholesterol Calculated


 61 mg/dL


(0-129) 


 


 





 


HDL Cholesterol


 65 mg/dL


(40-60) 


 


 





 


Cholesterol/HDL Ratio 1.9    


 


Thyroid Stimulating Hormone


(TSH) 1.366 uIU/mL


(0.358-3.74) 


 


 











Laboratory Tests








Test


 12/11/19


16:05 12/11/19


18:55 12/12/19


10:10


 


Troponin I Quantitative


 < 0.017 ng/mL


(0.000-0.055) < 0.017 ng/mL


(0.000-0.055) 





 


Triglycerides Level


 


 


 32 mg/dL


(0-150)


 


Cholesterol Level


 


 


 126 mg/dL


(0-200)


 


LDL Cholesterol, Calculated


 


 


 55 mg/dL


(0-100)


 


VLDL Cholesterol, Calculated   6 mg/dL (0-40) 


 


Non-HDL Cholesterol Calculated


 


 


 61 mg/dL


(0-129)


 


HDL Cholesterol


 


 


 65 mg/dL


(40-60)


 


Cholesterol/HDL Ratio   1.9 


 


Thyroid Stimulating Hormone


(TSH) 


 


 1.366 uIU/mL


(0.358-3.74)








Medications





Current Medications


Aspirin (Children'S Aspirin) 324 mg 1X  ONCE PO  Last administered on 12/11/19at

11:15;  Start 12/11/19 at 11:15;  Stop 12/11/19 at 11:16;  Status DC


Ondansetron HCl (Zofran) 4 mg PRN Q8HRS  PRN IV NAUSEA/VOMITING;  Start 12/11/19

at 13:15;  Stop 12/12/19 at 13:14;  Status DC


Morphine Sulfate (Morphine Sulfate) 2 mg PRN Q2HR  PRN IV PAIN;  Start 12/11/19 

at 13:15;  Stop 12/12/19 at 13:14;  Status DC


Aspirin (Ecotrin) 81 mg DAILY PO  Last administered on 12/12/19at 09:24;  Start 

12/12/19 at 09:00


Vitamin D (Vitamin D3) 2,000 unit DAILY PO  Last administered on 12/12/19at 

13:08;  Start 12/12/19 at 09:00


Finasteride (Proscar) 5 mg HS PO  Last administered on 12/11/19at 21:34;  Start 

12/11/19 at 21:00


Simvastatin (Zocor) 20 mg QHS PO  Last administered on 12/11/19at 21:35;  Start 

12/11/19 at 22:00


Tamsulosin HCl (Flomax) 0.4 mg HS PO  Last administered on 12/11/19at 21:34;  

Start 12/11/19 at 21:00


Ascorbic Acid (Vitamin C) 1,000 mg BID PO  Last administered on 12/12/19at 

13:08;  Start 12/11/19 at 22:00


Calcium Carbonate/ Glycine (Oscal) 500 mg DAILY PO  Last administered on 

12/12/19at 13:08;  Start 12/12/19 at 09:00


Non-Formulary Medication (Krill Oil ) 500 mg DAILY PO ;  Start 12/12/19 at 0

9:00;  Status UNV


Multivitamins (Thera M Plus) 1 tab DAILY PO  Last administered on 12/12/19at 

13:08;  Start 12/12/19 at 09:00


Pantoprazole Sodium (Protonix) 40 mg BIDAC PO  Last administered on 12/12/19at 

13:08;  Start 12/12/19 at 07:30


Sodium Chloride (Normal Saline Flush) 3 ml QSHIFT  PRN IV AFTER MEDS AND BLOOD 

DRAWS;  Start 12/11/19 at 21:00


Ondansetron HCl (Zofran) 4 mg PRN Q4HRS  PRN IV NAUSEA/VOMITING;  Start 12/11/19

at 21:00


Zolpidem Tartrate (Ambien) 5 mg PRN QHS  PRN PO INSOMNIA;  Start 12/11/19 at 

21:00


Acetaminophen (Tylenol) 650 mg PRN Q4HRS  PRN PO TEMP OVER 100.4F OR MILD PAIN; 

Start 12/11/19 at 21:00


Al Hydroxide/Mg Hydroxide (Mylanta Plus Xs) 30 ml PRN DAILY  PRN PO HEARTBURN / 

GAS;  Start 12/11/19 at 21:00


Clonidine HCl (Catapres) 0.1 mg PRN Q6HRS  PRN PO SBP>160 OR DBP>90;  Start 

12/11/19 at 21:00


Docusate Sodium (Colace) 100 mg PRN BID  PRN PO CONSTIPATION;  Start 12/11/19 at

21:00


Albuterol Sulfate (Ventolin Neb Soln) 2.5 mg PRN Q4HRS  PRN NEB SHORTNESS OF 

BREATH;  Start 12/11/19 at 21:00


Guaifenesin (Robitussin) 200 mg PRN Q4HRS  PRN PO COUGH;  Start 12/11/19 at 

21:00


Lorazepam (Ativan) 0.5 mg PRN Q4HRS  PRN PO ANXIETY / AGITATION;  Start 12/11/19

at 21:00


Enoxaparin Sodium (Lovenox 40mg Syringe) 40 mg DAILY SQ  Last administered on 12 /12/19at 13:09;  Start 12/12/19 at 09:00


Simvastatin (Zocor) 20 mg STK-MED ONCE .ROUTE ;  Start 12/11/19 at 21:07;  Stop 

12/11/19 at 21:07;  Status DC


Pantoprazole Sodium (Protonix) 40 mg STK-MED ONCE PO ;  Start 12/11/19 at 21:07;

 Stop 12/11/19 at 21:08;  Status DC


Pantoprazole Sodium (Protonix) 40 mg 1X  ONCE PO  Last administered on 

12/11/19at 21:45;  Start 12/11/19 at 21:45;  Stop 12/11/19 at 21:46;  Status DC





Active Scripts


Active


[Pantoprazole] 40 MG Tablet.dr 40 Mg PO BIDAC


Reported


Calcium Carbonate 300 Mg Tab.chew 600 Mg PO DAILY


Krill Oil 500 Mg Capsule 500 Mg PO DAILY


Vitamin D3 (Cholecalciferol (Vitamin D3)) 1,000 Unit Tablet 2,000 Unit PO DAILY


Vitamin C (Ascorbic Acid) 1,000 Mg Tablet 1,000 Mg PO BID


Aspir-Low (Aspirin) 81 Mg Tablet.dr 81 Mg PO DAILY


Multi-Vitamin Daily (Multivitamin) 1 Each Tablet 1 Each PO DAILY


Finasteride 5 Mg Tablet 1 Tab PO HS


Flomax (Tamsulosin Hcl) 0.4 Mg Cap.er.24h 1 Cap PO HS


Simvastatin 20 Mg Tablet 1 Tab PO DAILY


Vitals/I & O





Vital Sign - Last 24 Hours








 12/11/19 12/11/19 12/11/19 12/11/19





 13:33 14:03 15:00 16:00


 


Pulse 56 60 74 70


 


Resp 16 16 16 16


 


B/P (MAP) 125/79 (94) 147/79 (101) 155/78 (103) 114/68 (83)


 


Pulse Ox 97 97 98 100


 


O2 Delivery Room Air   





 12/11/19 12/11/19 12/11/19 12/12/19





 16:37 19:00 23:00 03:00


 


Temp  98.0 98.4 98.3





  98.0 98.4 98.3


 


Pulse 68 59 59 63


 


Resp 16 20 20 20


 


B/P (MAP) 119/69 (86) 143/69 (93) 112/64 (80) 107/71 (83)


 


Pulse Ox 96 95 95 95


 


O2 Delivery Room Air Room Air Room Air Room Air


 


    





    





 12/12/19 12/12/19 12/12/19 





 07:00 08:00 11:39 


 


Temp 97.8  97.8 





 97.8  97.8 


 


Pulse 70  89 


 


Resp 20  20 


 


B/P (MAP) 117/74 (88)  115/75 (88) 


 


Pulse Ox 93  92 


 


O2 Delivery Room Air Room Air Room Air 














Intake and Output   


 


 12/11/19 12/11/19 12/12/19





 15:00 23:00 07:00


 


Intake Total  200 ml 200 ml


 


Balance  200 ml 200 ml

















TILA FLORES MD        Dec 12, 2019 13:25

## 2019-12-13 LAB — HBA1C MFR BLD: 5.8 % (ref 4.8–5.6)

## 2020-01-08 ENCOUNTER — HOSPITAL ENCOUNTER (OUTPATIENT)
Dept: HOSPITAL 61 - NM | Age: 85
Discharge: HOME | End: 2020-01-08
Attending: INTERNAL MEDICINE
Payer: MEDICARE

## 2020-01-08 DIAGNOSIS — R07.9: Primary | ICD-10-CM

## 2020-01-08 PROCEDURE — A9500 TC99M SESTAMIBI: HCPCS

## 2020-01-08 PROCEDURE — 78452 HT MUSCLE IMAGE SPECT MULT: CPT

## 2020-01-08 PROCEDURE — 93017 CV STRESS TEST TRACING ONLY: CPT

## 2020-01-09 NOTE — RAD
MR#: [id number]

Account#: [id number]

Accession#: [id number]

Date of Study: 01/09/2020

Ordering Physician: JACKSON PRICE, 

Referring Physician: ZOË FRIEND Tech: PEARL Terrell, ARRT (R) (N)





--------------- APPROVED REPORT --------------





Test Type:          Pharmacological

Stress Nurse/Tech: Tricia Zhong RN

Test Indications: Chest Pain

Cardiac History: No known cardiac

Medications:     See Electronic Medical Record

Medical History: None per patient

Resting ECG:     SB wwith 1st AVB

Resting Heart Rate: 56 bpm

Resting Blood Pressure: 116/64mmHg

Pretest Chest Pain: No chest pain



Nurse/Tech Notes

S1S2, lungs CTA

Consent: The procedure was explained to the patient in lay terms. Informed consent was witnessed. Tab
eout was entered into [facility]. History and Stress Test performed by RT Coleman (R) (N)



Pharm. Details

Pharmacologic stress testing was performed using 0.4mg per 5ml of regadenoson given intravenously ove
r 7-10 seconds.



Stress Symptoms

Dyspnea



POST EXERCISE

Reason for Termination: Infusion complete

Max HR: 104 bpm

Max Blood Pressure: 120/65mmHg

Blood Pressure response to exercise: Normal blood pressure response during stress.

Heart Rate response to exercise: WNL

Chest Pain: No. 

Arrhythmia: No. 

ST Change: No. 



INTERPRETATION

Stress EKG Conclusion: Baseline EKG showed sinus rhythm.  No ischemic changes at peak stress.  No arr
hythmias.



Imaging Protocol

IMAGE PROTOCOL: Rest Tc-99m/stress Tc-99m 2 days



Rest:            Stress:         Viability:   

Radiopharm.Tc99m MztwsjthuIn87c Sestamibi

Awgb35kOc            35mCi            

Img Date  01/08/2020 01/09/2020      

Inj-Img Ctdm34tpd.           90min.           



Rest Admin Site:IV - Right AntecubitalAdministrator:PEARL Terrell, ARRT (R)(N)

Stress Admin Site: IV - Right AntecubitalAdministrator: RT AMPARO Cee)(N)



STRESS DATA

End Diast. Vol.82.0mlLVEDV index BSA40.0ml

End Syst. Vol.20.0mlLVESV index BSA10.0ml

Myocardial Oqgz926.0gEject. Jmgujbqt52.0%



Stress Scores

Regional WT0.00Summed WT5.00

Regional WM0.00Summed WM0.00



Study quality was good.  Left Ventricular size was Normal at Rest and Stress.

Lung uptake was .  Left Ventricular ejection fraction is 77%.

The rest and stress images show normal perfusion, normal contraction and thickening.



LV Perf. Quant

17 Seg. SSS0.00

17 Seg. SRS1.00

17 Seg. SDS0.00

Stress Defect Extent (% LAD)0.00Rest Defect Extent (% LAD)0.00Rev. Defect Extent (% LAD)0.00

Stress Defect Extent (% LCX) 0.00Rest Defect Extent (% LCX)0.00Rev. Defect Extent (% LCX)0.00

Stress Defect Extent (% RCA)0.00Rest Defect Extent (% RCA)0.00Rev. Defect Extent (% RCA)0.00

Stress Defect Extent (% BERNARD)0.00Rest Defect Extent (% BERNARD)0.00Rev. Defect Extent (% BERNARD)0.00



Conclusion

1. Regadenoson cardioisotope stress test did not show any evidence of ischemia or infarct.

2. Normal left ventricular systolic function with ejection fraction calculated at 77%.

3. Low risk for cardiac events.



Signed by : Jackson Price, 

Electronically Approved : 01/09/2020 11:49:34

## 2020-09-24 ENCOUNTER — HOSPITAL ENCOUNTER (OUTPATIENT)
Dept: HOSPITAL 61 - LAB | Age: 85
Discharge: HOME | End: 2020-09-24
Attending: INTERNAL MEDICINE
Payer: MEDICARE

## 2020-09-24 DIAGNOSIS — I12.9: Primary | ICD-10-CM

## 2020-09-24 DIAGNOSIS — E11.21: ICD-10-CM

## 2020-09-24 DIAGNOSIS — N40.1: ICD-10-CM

## 2020-09-24 DIAGNOSIS — D64.9: ICD-10-CM

## 2020-09-24 LAB
ALBUMIN SERPL-MCNC: 3.7 G/DL (ref 3.4–5)
ANION GAP SERPL CALC-SCNC: 7 MMOL/L (ref 6–14)
BUN SERPL-MCNC: 25 MG/DL (ref 8–26)
CALCIUM SERPL-MCNC: 9.5 MG/DL (ref 8.5–10.1)
CHLORIDE SERPL-SCNC: 103 MMOL/L (ref 98–107)
CO2 SERPL-SCNC: 30 MMOL/L (ref 21–32)
CREAT SERPL-MCNC: 1.5 MG/DL (ref 0.7–1.3)
GFR SERPLBLD BASED ON 1.73 SQ M-ARVRAT: 44.7 ML/MIN
GLUCOSE SERPL-MCNC: 157 MG/DL (ref 70–99)
PHOSPHATE SERPL-MCNC: 3.1 MG/DL (ref 2.6–4.7)
POTASSIUM SERPL-SCNC: 4.6 MMOL/L (ref 3.5–5.1)
SODIUM SERPL-SCNC: 140 MMOL/L (ref 136–145)

## 2020-09-24 PROCEDURE — 82728 ASSAY OF FERRITIN: CPT

## 2020-09-24 PROCEDURE — 83550 IRON BINDING TEST: CPT

## 2020-09-24 PROCEDURE — 80069 RENAL FUNCTION PANEL: CPT

## 2020-09-24 PROCEDURE — 83540 ASSAY OF IRON: CPT

## 2020-09-24 PROCEDURE — 83970 ASSAY OF PARATHORMONE: CPT

## 2020-09-24 PROCEDURE — 36415 COLL VENOUS BLD VENIPUNCTURE: CPT

## 2020-09-25 LAB
CALCIUM PTH: 9.7 MG/DL (ref 8.6–10.2)
CREATININE PTH: 1.53 MG/DL (ref 0.76–1.27)
PHOSPHORUS PTH: 3 MG/DL (ref 2.8–4.1)
PTH-INTACT SERPL-MCNC: 23 PG/ML (ref 15–65)

## 2021-08-25 ENCOUNTER — HOSPITAL ENCOUNTER (OUTPATIENT)
Dept: HOSPITAL 61 - ECHO | Age: 86
End: 2021-08-25
Attending: INTERNAL MEDICINE
Payer: MEDICARE

## 2021-08-25 DIAGNOSIS — Z01.810: Primary | ICD-10-CM

## 2021-08-25 DIAGNOSIS — I08.8: ICD-10-CM

## 2021-08-25 PROCEDURE — 78452 HT MUSCLE IMAGE SPECT MULT: CPT

## 2021-08-25 PROCEDURE — 93306 TTE W/DOPPLER COMPLETE: CPT

## 2021-08-25 PROCEDURE — 93017 CV STRESS TEST TRACING ONLY: CPT

## 2021-08-25 PROCEDURE — A9500 TC99M SESTAMIBI: HCPCS

## 2021-08-25 NOTE — RAD
MR#: [id number]

Account#: [id number]

Accession#: [id number]

Date of Study: 08/25/2021

Ordering Physician: JACKSON PRICE, 

Referring Physician: ZOË FRIEND Tech: RT Mary (R) (N)





--------------- APPROVED REPORT --------------





Test Type:          Pharmacological

Stress Nurse/Tech: LESLIE Sams RN

Test Indications: Pre Op for knee surgery

Cardiac History: See EMR.

Medications:     See EMR.

Medical History: See EMR.

Resting ECG:     SR

Resting Heart Rate: 49 bpm

Resting Blood Pressure: 143/67mmHg

Pretest Chest Pain: No chest pain



Nurse/Tech Notes

Lungs CTA, Heart tones regular.

Consent: The procedure was explained to the patient in lay terms. Informed consent was witnessed. Tab
eout was entered into [facility]. History and Stress Test performed by PEARL Terrell, TIMBO (R) 
(N)



Pharm. Details

Pharmacologic stress testing was performed using 0.4mg per 5ml of regadenoson given intravenously ove
r 7-10 seconds.



Stress Symptoms

Dyspnea, resolved by the end of stress.



POST EXERCISE

Reason for Termination: Infusion complete

Max HR: 68 bpm

Max Blood Pressure: 140/67mmHg

Blood Pressure response to exercise: Normal blood pressure response during stress.

Heart Rate response to exercise: WNL

Chest Pain: No. 

Arrhythmia: No. 

ST Change: No. 



INTERPRETATION

Stress EKG Conclusion: No evidence of stress induced EKG changes. 



Imaging Protocol

IMAGE PROTOCOL: Rest Tc-99m/stress Tc-99m 1 day



Rest:            Stress:         Viability:   

Radiopharm.Tc99m ZayudhjzbCx28c Sestamibi

Dose9.7mCi            31mCi            

Duration    15min.           10min.           

Img Date  08/25/2021 08/25/2021      

Inj-Img Qkob41hmp.           60min.           



Rest Admin Site:IV - Right AntecubitalAdministrator:RT Mary (R)(N)

Stress Admin Site: IV - Right AntecubitalAdministrator: PEARL Terrell, TIMBO (R)(N)



STRESS DATA

End Diast. Vol.92.0mlAv. Heart Rate57.0bpm

End Syst. Vol.26.0mlCO Index BSA0.0L/min

Myocardial Uogw643.0gEject. Ghmmklzm14.0%



Stress Rates

Pk. Fill Rate2.53EDV/secLVtime Pk. Fill 239.58msec

Pk. Empty Rate3.78ESV/secLVtime Pk. Lwpdj601.75msec

1/3 Pk. Fill1.26EDV/sec



Stress Scores

Regional WT0.00Summed WT0.00

Regional WM0.00Summed WM1.00



The rest and stress images show normal perfusion, normal contraction and thickening.



LV Perf. Quant

17 Seg. SSS0.00

17 Seg. SRS1.00

17 Seg. SDS0.00

Stress Defect Extent (% LAD)0.00Rest Defect Extent (% LAD)0.00Rev. Defect Extent (% LAD)0.00

Stress Defect Extent (% LCX) 0.00Rest Defect Extent (% LCX)0.00Rev. Defect Extent (% LCX)0.00

Stress Defect Extent (% RCA)0.00Rest Defect Extent (% RCA)0.00Rev. Defect Extent (% RCA)0.00

Stress Defect Extent (% BERNARD)0.00Rest Defect Extent (% BERNARD)0.00Rev. Defect Extent (% BERNARD)0.00



Other Information

Quality:Average

Risk Assessment: Low Risk



Conclusion

1. No evidence of EKG changes with stress testing.

2. Normal perfusion at stress/rest.

3. Low risk study.

4. EF > 60%.



Signed by : Reji Marroquin, 

Electronically Approved : 08/25/2021 22:51:21

## 2021-08-25 NOTE — CARD
MR#: [id number]

Account#: [id number]

Accession#: [id number]

Date of Study: 08/25/2021

Ordering Physician: JACKSON PRICE, 

Referring Physician: JACKSON PRICE 

Tech: Sully Lechuga [facility]





--------------- APPROVED REPORT --------------





EXAM: Two-dimensional and M-mode echocardiogram with Doppler and color Doppler.



Other Information 

Quality : GoodHR: 52bpm

Rhythm : NSR



INDICATION





RISK FACTORS

Hyperlipidemia



2D DIMENSIONS 

RVDd3.6 (2.9-3.5cm)Left Atrium(2D)4.5 (1.6-4.0cm)

IVSd0.9 (0.7-1.1cm)Aortic Root(2D)3.5 (2.0-3.7cm)

LVDd4.5 (3.9-5.9cm)LVOT Diameter2.3 (1.8-2.4cm)

PWd1.0 (0.7-1.1cm)LVDs3.0 (2.5-4.0cm)

FS (%) 34.5 %SV59.7 ml

LVEF(%)63.7 (>50%)



Aortic Valve

AoV Peak Kenji.140.3cm/sAoV VTI35.2cm

AO Peak GR.7.9mmHgLVOT Peak Kenji.119.6cm/s

AO Mean GR.3mmHgAVA (VMAX)3.68cm2



Mitral Valve

MV E Cvxiuflr16.0cm/sMV DECEL [id number]

MV A Lfcthhuh48.9cm/sE/A  Ratio1.4



Pulmonary Valve

PV Peak Jxknhgbd94.5cm/s



Tricuspid Valve

TR P. Vtndbjsy866ny/sTR Peak Gr.31mmHg



 LEFT VENTRICLE 

The left ventricle is normal size. There is normal left ventricular wall thickness. The left ventricu
lar systolic function is normal and the ejection fraction is within normal range. EF 55% There is nor
mal LV segmental wall motion. The left ventricular diastolic function and filling is normal for age.



 RIGHT VENTRICLE 

The right ventricle is normal size. There is normal right ventricular wall thickness. The right ventr
icular systolic function is normal.



 ATRIA 

The left atrium is mildly dilated. The right atrium size is normal. The interatrial septum is intact 
with no evidence for an atrial septal defect or patent foramen ovale as noted on 2-D or Doppler imagi
ng.



 AORTIC VALVE 

The aortic valve is normal in structure and function. Doppler and Color Flow revealed no significant 
aortic regurgitation. There is no significant aortic valvular stenosis.



 MITRAL VALVE 

The mitral valve is normal in structure and function. There is no evidence of mitral valve prolapse. 
There is no mitral valve stenosis. Doppler and Color-flow revealed trace to mild mitral regurgitation
.



 TRICUSPID VALVE 

The tricuspid valve is normal in structure and function. Doppler and Color Flow revealed mild to mode
rate tricuspid regurgitation. Estimated PAP 35 mmHg. There is no tricuspid valve stenosis.



 PULMONIC VALVE 

Doppler and Color Flow revealed mild pulmonic valvular regurgitation. There is no pulmonic valvular s
tenosis.



 GREAT VESSELS 

The aortic root is normal in size. The ascending aorta is normal in size. The IVC is normal in size a
nd collapses >50% with inspiration.



 PERICARDIAL EFFUSION 

There is no evidence of significant pericardial effusion.



Critical Notification

Critical Value: No



<Conclusion>

The left ventricular systolic function is normal and the ejection fraction is within normal range. EF
 55%

There is normal LV segmental wall motion.

Doppler and Color Flow revealed mild to moderate tricuspid regurgitation.  Estimated PAP 35 mmHg.



Signed by : Reji Marroquin, 

Electronically Approved : 08/25/2021 21:54:43

## 2021-10-01 ENCOUNTER — HOSPITAL ENCOUNTER (OUTPATIENT)
Dept: HOSPITAL 61 - LAB | Age: 86
End: 2021-10-01
Attending: INTERNAL MEDICINE
Payer: MEDICARE

## 2021-10-01 DIAGNOSIS — I12.9: Primary | ICD-10-CM

## 2021-10-01 DIAGNOSIS — N40.1: ICD-10-CM

## 2021-10-01 DIAGNOSIS — N18.31: ICD-10-CM

## 2021-10-01 DIAGNOSIS — E11.21: ICD-10-CM

## 2021-10-01 DIAGNOSIS — D64.9: ICD-10-CM

## 2021-10-01 LAB
ALBUMIN SERPL-MCNC: 3.8 G/DL (ref 3.4–5)
ANION GAP SERPL CALC-SCNC: 8 MMOL/L (ref 6–14)
BASOPHILS # BLD AUTO: 0.1 X10^3/UL (ref 0–0.2)
BASOPHILS NFR BLD: 2 % (ref 0–3)
BUN SERPL-MCNC: 25 MG/DL (ref 8–26)
CALCIUM SERPL-MCNC: 10.7 MG/DL (ref 8.5–10.1)
CHLORIDE SERPL-SCNC: 101 MMOL/L (ref 98–107)
CO2 SERPL-SCNC: 30 MMOL/L (ref 21–32)
CREAT SERPL-MCNC: 1.6 MG/DL (ref 0.7–1.3)
EOSINOPHIL NFR BLD: 0.1 X10^3/UL (ref 0–0.7)
EOSINOPHIL NFR BLD: 2 % (ref 0–3)
ERYTHROCYTE [DISTWIDTH] IN BLOOD BY AUTOMATED COUNT: 13.4 % (ref 11.5–14.5)
GFR SERPLBLD BASED ON 1.73 SQ M-ARVRAT: 41.4 ML/MIN
GLUCOSE SERPL-MCNC: 107 MG/DL (ref 70–99)
HCT VFR BLD CALC: 34.6 % (ref 39–53)
HGB BLD-MCNC: 11.4 G/DL (ref 13–17.5)
LYMPHOCYTES # BLD: 1.1 X10^3/UL (ref 1–4.8)
LYMPHOCYTES NFR BLD AUTO: 17 % (ref 24–48)
MCH RBC QN AUTO: 31 PG (ref 25–35)
MCHC RBC AUTO-ENTMCNC: 33 G/DL (ref 31–37)
MCV RBC AUTO: 95 FL (ref 79–100)
MONO #: 0.6 X10^3/UL (ref 0–1.1)
MONOCYTES NFR BLD: 9 % (ref 0–9)
NEUT #: 4.7 X10^3/UL (ref 1.8–7.7)
NEUTROPHILS NFR BLD AUTO: 70 % (ref 31–73)
PHOSPHATE SERPL-MCNC: 3.6 MG/DL (ref 2.6–4.7)
PLATELET # BLD AUTO: 233 X10^3/UL (ref 140–400)
POTASSIUM SERPL-SCNC: 5.1 MMOL/L (ref 3.5–5.1)
RBC # BLD AUTO: 3.64 X10^6/UL (ref 4.3–5.7)
SODIUM SERPL-SCNC: 139 MMOL/L (ref 136–145)
WBC # BLD AUTO: 6.8 X10^3/UL (ref 4–11)

## 2021-10-01 PROCEDURE — 85025 COMPLETE CBC W/AUTO DIFF WBC: CPT

## 2021-10-01 PROCEDURE — 36415 COLL VENOUS BLD VENIPUNCTURE: CPT

## 2021-10-01 PROCEDURE — 80069 RENAL FUNCTION PANEL: CPT

## 2021-10-01 PROCEDURE — 83540 ASSAY OF IRON: CPT

## 2021-10-01 PROCEDURE — 83970 ASSAY OF PARATHORMONE: CPT

## 2021-10-01 PROCEDURE — 83550 IRON BINDING TEST: CPT

## 2021-10-01 PROCEDURE — 82728 ASSAY OF FERRITIN: CPT

## 2021-10-02 LAB
CALCIUM PTH: 10.6 MG/DL (ref 8.6–10.2)
CREATININE PTH: 1.47 MG/DL (ref 0.76–1.27)
PHOSPHORUS PTH: 3.4 MG/DL (ref 2.8–4.1)
PTH-INTACT SERPL-MCNC: 16 PG/ML (ref 15–65)

## 2022-03-01 ENCOUNTER — HOSPITAL ENCOUNTER (OUTPATIENT)
Dept: HOSPITAL 61 - CT | Age: 87
End: 2022-03-01
Attending: REGISTERED NURSE
Payer: MEDICARE

## 2022-03-01 DIAGNOSIS — K57.30: ICD-10-CM

## 2022-03-01 DIAGNOSIS — J84.10: ICD-10-CM

## 2022-03-01 DIAGNOSIS — I70.1: ICD-10-CM

## 2022-03-01 DIAGNOSIS — K44.9: ICD-10-CM

## 2022-03-01 DIAGNOSIS — I71.4: Primary | ICD-10-CM

## 2022-03-01 DIAGNOSIS — J43.9: ICD-10-CM

## 2022-03-01 DIAGNOSIS — N40.0: ICD-10-CM

## 2022-03-01 DIAGNOSIS — N28.89: ICD-10-CM

## 2022-03-01 LAB
CREAT SERPL-MCNC: 1.5 MG/DL (ref 0.7–1.3)
GFR SERPLBLD BASED ON 1.73 SQ M-ARVRAT: 44.5 ML/MIN

## 2022-03-01 PROCEDURE — 82565 ASSAY OF CREATININE: CPT

## 2022-03-01 PROCEDURE — 36415 COLL VENOUS BLD VENIPUNCTURE: CPT

## 2022-03-01 PROCEDURE — 74174 CTA ABD&PLVS W/CONTRAST: CPT

## 2022-03-01 NOTE — RAD
CTA ABDOMEN AND PELVIS WITHOUT IV CONTRAST



History: Abdominal aortic aneurysm.



Comparison: CT angiogram 6/12/2018, 4/9/2013.



Technique: CTA angiogram of the abdomen and pelvis with intravenous contrast. 3-D postprocessing perf
ormed with MIPS and surface rendered reformats. 



Findings:

The abdominal aorta measures 2.7 cm at the hiatus. The origin of the celiac and superior mesenteric a
rteries are widely patent. There is atherosclerosis of the origin of the duplicated right and single 
left renal arteries without significant stenosis. There is an infrarenal aortic aneurysm, somewhat fu
siform-bilobed and mildly tortuous measuring up to 4.7 cm AP diameter (measured in sagittal plane, se
graham 6 image 37), increased from prior studies, most recently 3.9 cm when measured similarly on June 2018 exam. Patent origin of the YFN. Inferior to the YFN origin, there is aneurysmal dilatation extre
mity into the bilobed appearance measuring 4.3 cm AP, also increased from prior exam. Moderate mural 
atheroma. The bilateral common iliac arteries and branches are patent with moderate atherosclerotic c
alcifications.



Calcified granulomas right lower lobe. Mild emphysematous changes. The liver, gallbladder, and pancre
as are unremarkable. Punctate calcifications in the spleen consistent with granulomatous disease. Mul
tiple bilateral cystic renal lesions are 1.1 cm hypodensity of the posterior cortex of the right kidn
ey measures 38 Hounsfield units, and is mildly enlarged from 2018 comparison, previously 0.8 cm. Bila
teral perinephric stranding. There is an enlarging left perinephric cystic mass measuring 15 x 9 x 19
 cm. The bladder is unremarkable. Mildly enlarged prostate.



Small sliding hiatal hernia. Stomach is otherwise unremarkable. Abnormal course of the duodenum which
 remains on the right side of the midline from suspected mass effect from left perinephric cystic mas
s. The cecum is in normal normal position in the right lower quadrant. Normal appendix. Extensive sig
moid diverticulosis.



No intra-abdominal free air or free fluid. No adenopathy. Small fat-containing left inguinal hernia. 
No acute osseous abnormality.



Impression: 



1.  Infrarenal aortic aneurysm, enlarging from 2018, now measuring 4.7 cm maximum AP diameter.

2.  Indeterminate intermediate density right renal cystic lesion measuring 38 Hounsfield units, 1.1 c
m diameter, enlarged from 2018. This may represent proteinaceous or hemorrhagic cyst versus renal mas
s. Recommend multi phase MRI or CT of the kidneys for characterization.

3.  Enlarging left perinephric cystic mass which measures fluid attenuation and has been present sinc
e at least 2013. This now measures 15 x 9 x 19 cm with mass effect on the small bowel and colon.

4.  Extensive sigmoid diverticulosis without evidence of diverticulitis.





------

Exposure: One or more of the following individualized dose reduction techniques were utilized for thi
s examination:  

1. Automated exposure control

2. Adjustment of the mA and/or kV according to patient size

3. Use of iterative reconstruction technique.



Electronically signed by: Baldev Feliz MD (3/1/2022 1:02 PM) [id number]

## 2023-05-27 NOTE — PDOC
CHEIKH VILLA APRN 4/9/19 0921:


SURGICAL PROGRESS NOTE


Subjective


tolerating diet


urinating


ambulating 


pain managed


Vital Signs





Vital Signs








  Date Time  Temp Pulse Resp B/P (MAP) Pulse Ox O2 Delivery O2 Flow Rate FiO2


 


4/9/19 07:52      Room Air  


 


4/9/19 07:00 98.1 62 16 129/61 (83) 96   





 98.1       


 


4/8/19 13:10       2.0 








I&O











Intake and Output 


 


 4/9/19





 07:00


 


Intake Total 1720 ml


 


Output Total 10 ml


 


Balance 1710 ml


 


 


 


Intake Oral 620 ml


 


IV Total 1100 ml


 


Output Estimated Blood Loss 10 ml


 


# Voids 6








General:  Alert, Oriented X3, Cooperative, No acute distress


Abdomen:  Soft, Other (ND)


Skin:  Other ([facility] incision c/d/i, no erythema )


Labs





Laboratory Tests








Test


 4/8/19


09:04 4/8/19


21:31


 


Glucose (Fingerstick)


 112 mg/dL


(70-99) 206 mg/dL


(70-99)








Laboratory Tests








Test


 4/8/19


21:31


 


Glucose (Fingerstick)


 206 mg/dL


(70-99)








Assessment/Plan


s/p [facility] 


home


FU 2 weeks, scripts on chart, no lifting





OTIS RODNEY MD 4/9/19 1145:


SURGICAL PROGRESS NOTE


Assessment/Plan


Agree with above











CHEIKH VILLA APRN Apr 9, 2019 09:21


OTIS RODNEY MD Apr 9, 2019 11:45
PROGRESS NOTES


Subjective


Subjective


Patient feeling well. tolerating diet.





Objective


Objective





Vital Signs








  Date Time  Temp Pulse Resp B/P (MAP) Pulse Ox O2 Delivery O2 Flow Rate FiO2


 


4/9/19 07:52      Room Air  


 


4/9/19 07:00 98.1 62 16 129/61 (83) 96   





 98.1       


 


4/8/19 13:10       2.0 














Intake and Output 


 


 4/9/19





 07:00


 


Intake Total 1720 ml


 


Output Total 10 ml


 


Balance 1710 ml


 


 


 


Intake Oral 620 ml


 


IV Total 1100 ml


 


Output Estimated Blood Loss 10 ml


 


# Voids 6











Physical Exam


Abdomen:  Normal bowel sounds


Heart:  Regular rate


Extremities:  No edema


General:  Alert


Lungs:  Clear to auscultation





Assessment


Assessment


1.  POD # 1 right inguinal hernial repair with mesh


2.  Diet controlled type 2 diabetes.


3.  Diet controlled hypertension.


4.  Chronic renal insufficiency with baseline creatinine of 1.7, followed by


Renal as an outpatient.





Plan


Plan of Care


Stable for D/c f/u 1 week bring meds to appt





Comment


Review of Relevant


I have reviewed the following items ca (where applicable) has been applied.


Labs





Laboratory Tests








Test


 4/8/19


09:04 4/8/19


21:31


 


Glucose (Fingerstick)


 112 mg/dL


(70-99) 206 mg/dL


(70-99)








Laboratory Tests








Test


 4/8/19


21:31


 


Glucose (Fingerstick)


 206 mg/dL


(70-99)








Medications





Current Medications


Cefazolin Sodium/ Dextrose 50 ml @  100 mls/hr 1X PREOP  PRN IV PRIOR TO 

PROCEDURE Last administered on 4/8/19at 09:57;  Start 4/8/19 at 06:00;  Stop 4/8 /19 at 18:00;  Status DC


Bacitracin 71444 unit/Sodium Chloride 500 ml @  500 mls/hr 1X  ONCE IRR  Last 

administered on 4/8/19at 10:38;  Start 4/8/19 at 06:00;  Stop 4/8/19 at 06:59;  

Status DC


Ondansetron HCl (Zofran) 4 mg PRN Q6HRS  PRN IV NAUSEA/VOMITING;  Start 4/8/19 

at 07:30;  Stop 4/8/19 at 20:00;  Status DC


Fentanyl Citrate (Fentanyl 2ml Vial) 25 mcg PRN Q5MIN  PRN IV MILD PAIN;  Start 

4/8/19 at 07:30;  Stop 4/8/19 at 20:00;  Status DC


Fentanyl Citrate (Fentanyl 2ml Vial) 50 mcg PRN Q5MIN  PRN IV MODERATE TO 

SEVERE PAIN Last administered on 4/8/19at 12:54;  Start 4/8/19 at 07:30;  Stop 4 /8/19 at 20:00;  Status DC


Morphine Sulfate (Morphine Sulfate) 1 mg PRN Q10MIN  PRN IV SEVERE PAIN;  Start 

4/8/19 at 07:30;  Stop 4/8/19 at 20:00;  Status DC


Ringer's Solution 1,000 ml @  30 mls/hr Q24H IV  Last administered on 4/8/19at 

09:11;  Start 4/8/19 at 07:18;  Stop 4/8/19 at 19:17;  Status DC


Hydromorphone HCl (Dilaudid) 0.5 mg PRN Q10MIN  PRN IV SEV PAIN, Second choice;

  Start 4/8/19 at 07:30;  Stop 4/8/19 at 20:00;  Status DC


Prochlorperazine Edisylate (Compazine) 5 mg PACU PRN  PRN IV NAUSEA, MRX1;  

Start 4/8/19 at 07:30;  Stop 4/8/19 at 20:00;  Status DC


Bupivacaine HCl/ Epinephrine Bitart (Sensorcain-Mpf Epi 0.5%-1:460313) 30 ml STK

-MED ONCE .ROUTE ;  Start 4/8/19 at 06:23;  Stop 4/8/19 at 07:24;  Status DC


Propofol 20 ml @ As Directed STK-MED ONCE IV ;  Start 4/8/19 at 08:44;  Stop 4/8 /19 at 08:45;  Status DC


Lidocaine HCl (Lidocaine Pf 2% Vial) 5 ml STK-MED ONCE .ROUTE ;  Start 4/8/19 

at 08:44;  Stop 4/8/19 at 08:45;  Status DC


Fentanyl Citrate (Fentanyl 2ml Vial) 100 mcg STK-MED ONCE .ROUTE ;  Start 4/8/ 19 at 08:44;  Stop 4/8/19 at 08:45;  Status DC


Dexamethasone Sodium Phosphate (Decadron) 20 mg STK-MED ONCE .ROUTE ;  Start 4/8 /19 at 09:58;  Stop 4/8/19 at 09:59;  Status DC


Sevoflurane (Ultane) 30 ml STK-MED ONCE IH ;  Start 4/8/19 at 09:58;  Stop 4/8/ 19 at 09:59;  Status DC


Ondansetron HCl (Zofran) 4 mg STK-MED ONCE .ROUTE ;  Start 4/8/19 at 10:10;  

Stop 4/8/19 at 10:11;  Status DC


Bupivacaine HCl/ Epinephrine Bitart (Sensorcain-Mpf Epi 0.5%-1:267999) 30 ml STK

-MED ONCE INJ  Last administered on 4/8/19at 09:48;  Start 4/8/19 at 09:48;  

Stop 4/8/19 at 10:17;  Status DC


Fentanyl Citrate (Fentanyl 2ml Vial) 100 mcg STK-MED ONCE .ROUTE ;  Start 4/8/ 19 at 11:20;  Stop 4/8/19 at 11:21;  Status DC


Sodium Chloride (Normal Saline Flush) 3 ml QSHIFT  PRN IV AFTER MEDS AND BLOOD 

DRAWS;  Start 4/8/19 at 11:30


Sodium Chloride 1,000 ml @  70 mls/hr [id number] IV ;  Start 4/8/19 at 11:18;  

Stop 4/9/19 at 02:56;  Status DC


Acetaminophen/ Hydrocodone Bitart (Lortab 5/325) 1 tab PRN Q4HRS  PRN PO MILD 

PAIN Last administered on 4/9/19at 06:23;  Start 4/8/19 at 11:30


Acetaminophen/ Hydrocodone Bitart (Lortab 5/325) 2 tab PRN Q4HRS  PRN PO 

MODERATE PAIN, SEVERE PAIN;  Start 4/8/19 at 11:30


Hydromorphone HCl (Dilaudid) 0.2 mg PRN Q1HR  PRN IV PAIN, SEE COMMENTS Last 

administered on 4/8/19at 13:43;  Start 4/8/19 at 11:30


Ondansetron HCl (Zofran) 4 mg PRN Q6HRS  PRN IV NAUESA, 1ST CHOICE;  Start 4/8/ 19 at 11:30


Aspirin (Ecotrin) 81 mg DAILY PO  Last administered on 4/9/19at 08:20;  Start 4/ 9/19 at 09:00


Vitamin D (Vitamin D3) 2,000 unit DAILY PO  Last administered on 4/9/19at 08:19

;  Start 4/9/19 at 09:00


Finasteride (Proscar) 5 mg HS PO  Last administered on 4/8/19at 20:11;  Start 4/ 8/19 at 21:00


Tamsulosin HCl (Flomax) 0.4 mg HS PO  Last administered on 4/8/19at 20:12;  

Start 4/8/19 at 21:00


Ascorbic Acid (Vitamin C) 1,000 mg BID PO  Last administered on 4/9/19at 08:20;

  Start 4/8/19 at 21:00


Calcium Carbonate/ Glycine (Tums) 500 mg DAILY PO  Last administered on 4/9/ 19at 08:20;  Start 4/9/19 at 09:00


Fish Oil (Fish Oil) 1,000 mg DAILY PO  Last administered on 4/9/19at 08:19;  

Start 4/9/19 at 09:00


Multivitamins (Thera M Plus) 1 tab DAILY PO  Last administered on 4/9/19at 08:19

;  Start 4/9/19 at 09:00


Simvastatin (Zocor) 20 mg HS PO  Last administered on 4/8/19at 20:12;  Start 4/8 /19 at 21:00


Pantoprazole Sodium (Protonix) 40 mg BIDAC PO  Last administered on 4/9/19at 05:

41;  Start 4/8/19 at 16:30


Fentanyl Citrate (Fentanyl 2ml Vial) 100 mcg STK-MED ONCE .ROUTE ;  Start 4/8/ 19 at 12:28;  Stop 4/8/19 at 12:29;  Status DC





Active Scripts


Active


Hydrocodone-Apap 5-325  ** (Hydrocodone Bit/Acetaminophen) 1 Tab Tablet 1 Tab 

PO PRN Q4HRS PRN 30 Days


[Pantoprazole] 40 MG Tablet. 40 Mg PO BIDAC


Reported


Calcium Carbonate 300 Mg Tab.chew 600 Mg PO DAILY


Krill Oil 500 Mg Capsule 500 Mg PO DAILY


Vitamin D3 (Cholecalciferol (Vitamin D3)) 1,000 Unit Tablet 2,000 Unit PO DAILY


Vitamin C (Ascorbic Acid) 1,000 Mg Tablet 1,000 Mg PO BID


Aspir-Low (Aspirin) 81 Mg Tablet. 81 Mg PO DAILY


Multi-Vitamin Daily (Multivitamin) 1 Each Tablet 1 Each PO DAILY


Finasteride 5 Mg Tablet 1 Tab PO HS


Flomax (Tamsulosin Hcl) 0.4 Mg Cap.er.24h 1 Cap PO HS


Simvastatin 20 Mg Tablet 1 Tab PO DAILY


Vitals/I & O





Vital Sign - Last 24 Hours








 4/8/19 4/8/19 4/8/19 4/8/19





 11:12 11:12 11:27 11:30


 


Temp 98   





 98.0   


 


Pulse 66  56 


 


Resp 14  18 18


 


B/P (MAP) 148/75  144/81 


 


Pulse Ox 97  96 93


 


O2 Delivery Simple Mask Mask Simple Mask Room Air


 


O2 Flow Rate 8 8 8 


 


    





    





 4/8/19 4/8/19 4/8/19 4/8/19





 11:42 11:57 12:00 12:12


 


Pulse 66 58  61


 


Resp 18 18 16 18


 


B/P (MAP) 142/70 139/70  141/76


 


Pulse Ox 94 94 94 96


 


O2 Delivery Room Air Room Air Room Air Nasal Cannula


 


O2 Flow Rate    2





 4/8/19 4/8/19 4/8/19 4/8/19





 12:27 12:30 12:42 12:53


 


Temp   98 





   98.0 


 


Pulse 60  64 


 


Resp 18 22 18 


 


B/P (MAP) 141/80  141/80 


 


Pulse Ox 96 96 96 


 


O2 Delivery Nasal Cannula Nasal Cannula Nasal Cannula Nasal Cannula


 


O2 Flow Rate 2 2.0 2 2


 


    





    





 4/8/19 4/8/19 4/8/19 4/8/19





 12:54 13:10 13:43 13:50


 


Resp 16   


 


Pulse Ox 96   


 


O2 Delivery Nasal Cannula Nasal Cannula Room Air Room Air


 


O2 Flow Rate 2.0 2.0  





 4/8/19 4/8/19 4/8/19 4/8/19





 15:00 16:17 19:00 20:11


 


Temp   98.1 





   98.1 


 


Pulse 63  77 


 


Resp 18  18 


 


B/P (MAP) 147/82 (103)  111/53 (72) 


 


Pulse Ox 97  91 


 


O2 Delivery Room Air Room Air Room Air Room Air


 


    





    





 4/8/19 4/8/19 4/9/19 4/9/19





 21:18 23:00 06:23 07:00


 


Temp  98.1  98.1





  98.1  98.1


 


Pulse  62  62


 


Resp  18  16


 


B/P (MAP)  113/54 (73)  129/61 (83)


 


Pulse Ox  94  96


 


O2 Delivery Room Air Room Air Room Air Room Air


 


    





    





 4/9/19 4/9/19  





 07:00 07:52  


 


O2 Delivery Room Air Room Air  














Intake and Output   


 


 4/8/19 4/8/19 4/9/19





 15:00 23:00 07:00


 


Intake Total 1220 ml  500 ml


 


Output Total 10 ml  


 


Balance 1210 ml  500 ml

















FREDRICK RUSSELL MD Apr 9, 2019 09:13
within defined limits